# Patient Record
Sex: MALE | Race: WHITE | NOT HISPANIC OR LATINO | Employment: OTHER | URBAN - METROPOLITAN AREA
[De-identification: names, ages, dates, MRNs, and addresses within clinical notes are randomized per-mention and may not be internally consistent; named-entity substitution may affect disease eponyms.]

---

## 2017-04-12 ENCOUNTER — APPOINTMENT (OUTPATIENT)
Dept: PREADMISSION TESTING | Facility: HOSPITAL | Age: 62
End: 2017-04-12

## 2017-04-12 ENCOUNTER — HOSPITAL ENCOUNTER (OUTPATIENT)
Dept: GENERAL RADIOLOGY | Facility: HOSPITAL | Age: 62
Discharge: HOME | End: 2017-04-12
Admitting: ORTHOPAEDIC SURGERY

## 2017-04-12 ENCOUNTER — HOSPITAL ENCOUNTER (OUTPATIENT)
Dept: GENERAL RADIOLOGY | Facility: HOSPITAL | Age: 62
Discharge: HOME | End: 2017-04-12

## 2017-04-12 VITALS
HEART RATE: 70 BPM | RESPIRATION RATE: 18 BRPM | SYSTOLIC BLOOD PRESSURE: 116 MMHG | TEMPERATURE: 97.4 F | HEIGHT: 70 IN | DIASTOLIC BLOOD PRESSURE: 76 MMHG | OXYGEN SATURATION: 97 %

## 2017-04-12 LAB
ABO GROUP BLD: NORMAL
ALBUMIN SERPL-MCNC: 4.4 G/DL (ref 3.5–5.2)
ALBUMIN/GLOB SERPL: 1.4 G/DL
ALP SERPL-CCNC: 85 U/L (ref 39–117)
ALT SERPL W P-5'-P-CCNC: 52 U/L (ref 1–41)
ANION GAP SERPL CALCULATED.3IONS-SCNC: 14.6 MMOL/L
AST SERPL-CCNC: 34 U/L (ref 1–40)
BILIRUB SERPL-MCNC: 0.5 MG/DL (ref 0.1–1.2)
BILIRUB UR QL STRIP: NEGATIVE
BLD GP AB SCN SERPL QL: NEGATIVE
BUN BLD-MCNC: 11 MG/DL (ref 8–23)
BUN/CREAT SERPL: 12.9 (ref 7–25)
CALCIUM SPEC-SCNC: 9.5 MG/DL (ref 8.6–10.5)
CHLORIDE SERPL-SCNC: 101 MMOL/L (ref 98–107)
CLARITY UR: CLEAR
CO2 SERPL-SCNC: 24.4 MMOL/L (ref 22–29)
COLOR UR: YELLOW
CREAT BLD-MCNC: 0.85 MG/DL (ref 0.76–1.27)
DEPRECATED RDW RBC AUTO: 48 FL (ref 37–54)
ERYTHROCYTE [DISTWIDTH] IN BLOOD BY AUTOMATED COUNT: 14.8 % (ref 11.5–14.5)
GFR SERPL CREATININE-BSD FRML MDRD: 92 ML/MIN/1.73
GLOBULIN UR ELPH-MCNC: 3.1 GM/DL
GLUCOSE BLD-MCNC: 103 MG/DL (ref 65–99)
GLUCOSE UR STRIP-MCNC: NEGATIVE MG/DL
HCT VFR BLD AUTO: 45.9 % (ref 40.4–52.2)
HGB BLD-MCNC: 15 G/DL (ref 13.7–17.6)
HGB UR QL STRIP.AUTO: NEGATIVE
INR PPP: 1.04 (ref 0.9–1.1)
KETONES UR QL STRIP: NEGATIVE
LEUKOCYTE ESTERASE UR QL STRIP.AUTO: NEGATIVE
MCH RBC QN AUTO: 29.2 PG (ref 27–32.7)
MCHC RBC AUTO-ENTMCNC: 32.7 G/DL (ref 32.6–36.4)
MCV RBC AUTO: 89.3 FL (ref 79.8–96.2)
NITRITE UR QL STRIP: NEGATIVE
PH UR STRIP.AUTO: 6.5 [PH] (ref 5–8)
PLATELET # BLD AUTO: 155 10*3/MM3 (ref 140–500)
PMV BLD AUTO: 10.1 FL (ref 6–12)
POTASSIUM BLD-SCNC: 4.7 MMOL/L (ref 3.5–5.2)
PROT SERPL-MCNC: 7.5 G/DL (ref 6–8.5)
PROT UR QL STRIP: NEGATIVE
PROTHROMBIN TIME: 13.2 SECONDS (ref 11.7–14.2)
RBC # BLD AUTO: 5.14 10*6/MM3 (ref 4.6–6)
RH BLD: POSITIVE
SODIUM BLD-SCNC: 140 MMOL/L (ref 136–145)
SP GR UR STRIP: 1.01 (ref 1–1.03)
UROBILINOGEN UR QL STRIP: NORMAL
WBC NRBC COR # BLD: 8.68 10*3/MM3 (ref 4.5–10.7)

## 2017-04-12 PROCEDURE — 71020 HC CHEST PA AND LATERAL: CPT

## 2017-04-12 PROCEDURE — 86850 RBC ANTIBODY SCREEN: CPT

## 2017-04-12 PROCEDURE — 93005 ELECTROCARDIOGRAM TRACING: CPT

## 2017-04-12 PROCEDURE — 36415 COLL VENOUS BLD VENIPUNCTURE: CPT

## 2017-04-12 PROCEDURE — 93010 ELECTROCARDIOGRAM REPORT: CPT | Performed by: INTERNAL MEDICINE

## 2017-04-12 PROCEDURE — 86901 BLOOD TYPING SEROLOGIC RH(D): CPT

## 2017-04-12 PROCEDURE — 85610 PROTHROMBIN TIME: CPT

## 2017-04-12 PROCEDURE — 73560 X-RAY EXAM OF KNEE 1 OR 2: CPT

## 2017-04-12 PROCEDURE — 85027 COMPLETE CBC AUTOMATED: CPT

## 2017-04-12 PROCEDURE — 81003 URINALYSIS AUTO W/O SCOPE: CPT

## 2017-04-12 PROCEDURE — 86900 BLOOD TYPING SEROLOGIC ABO: CPT

## 2017-04-12 PROCEDURE — 80053 COMPREHEN METABOLIC PANEL: CPT

## 2017-04-12 RX ORDER — GABAPENTIN 600 MG/1
1200 TABLET ORAL EVERY EVENING
COMMUNITY

## 2017-04-12 RX ORDER — AMLODIPINE BESYLATE 10 MG/1
10 TABLET ORAL DAILY
COMMUNITY
End: 2019-08-07 | Stop reason: ALTCHOICE

## 2017-04-12 RX ORDER — OMEPRAZOLE 20 MG/1
20 CAPSULE, DELAYED RELEASE ORAL DAILY
COMMUNITY
End: 2019-12-09

## 2017-04-12 RX ORDER — OXYCODONE HYDROCHLORIDE 10 MG/1
10 TABLET ORAL EVERY 4 HOURS PRN
COMMUNITY
End: 2019-12-09

## 2017-04-12 RX ORDER — METOPROLOL SUCCINATE 50 MG/1
75 TABLET, EXTENDED RELEASE ORAL DAILY
COMMUNITY
End: 2019-08-14 | Stop reason: SDUPTHER

## 2017-04-12 RX ORDER — ASPIRIN 81 MG/1
81 TABLET ORAL DAILY
COMMUNITY
End: 2017-04-27 | Stop reason: HOSPADM

## 2017-04-12 RX ORDER — BACLOFEN 10 MG/1
5 TABLET ORAL EVERY 8 HOURS PRN
COMMUNITY
End: 2019-12-09

## 2017-04-25 ENCOUNTER — ANESTHESIA EVENT (OUTPATIENT)
Dept: PERIOP | Facility: HOSPITAL | Age: 62
End: 2017-04-25

## 2017-04-25 ENCOUNTER — APPOINTMENT (OUTPATIENT)
Dept: GENERAL RADIOLOGY | Facility: HOSPITAL | Age: 62
End: 2017-04-25

## 2017-04-25 ENCOUNTER — ANESTHESIA (OUTPATIENT)
Dept: PERIOP | Facility: HOSPITAL | Age: 62
End: 2017-04-25

## 2017-04-25 ENCOUNTER — HOSPITAL ENCOUNTER (INPATIENT)
Facility: HOSPITAL | Age: 62
LOS: 2 days | Discharge: HOME HEALTH | End: 2017-04-27
Attending: ORTHOPAEDIC SURGERY | Admitting: ORTHOPAEDIC SURGERY

## 2017-04-25 DIAGNOSIS — R26.89 IMPAIRED GAIT AND MOBILITY: Primary | ICD-10-CM

## 2017-04-25 PROBLEM — M17.10 PRIMARY OSTEOARTHRITIS OF KNEE: Status: ACTIVE | Noted: 2017-04-25

## 2017-04-25 PROCEDURE — 25010000002 HYDROMORPHONE PER 4 MG: Performed by: NURSE ANESTHETIST, CERTIFIED REGISTERED

## 2017-04-25 PROCEDURE — C1713 ANCHOR/SCREW BN/BN,TIS/BN: HCPCS | Performed by: ORTHOPAEDIC SURGERY

## 2017-04-25 PROCEDURE — 25010000002 SUCCINYLCHOLINE PER 20 MG: Performed by: NURSE ANESTHETIST, CERTIFIED REGISTERED

## 2017-04-25 PROCEDURE — 25010000002 MORPHINE PER 10 MG: Performed by: ORTHOPAEDIC SURGERY

## 2017-04-25 PROCEDURE — 25010000002 KETOROLAC TROMETHAMINE PER 15 MG: Performed by: ORTHOPAEDIC SURGERY

## 2017-04-25 PROCEDURE — C1776 JOINT DEVICE (IMPLANTABLE): HCPCS | Performed by: ORTHOPAEDIC SURGERY

## 2017-04-25 PROCEDURE — 25010000003 CEFAZOLIN IN DEXTROSE 2-4 GM/100ML-% SOLUTION: Performed by: ORTHOPAEDIC SURGERY

## 2017-04-25 PROCEDURE — 0SRC0J9 REPLACEMENT OF RIGHT KNEE JOINT WITH SYNTHETIC SUBSTITUTE, CEMENTED, OPEN APPROACH: ICD-10-PCS | Performed by: ORTHOPAEDIC SURGERY

## 2017-04-25 PROCEDURE — 25010000002 FENTANYL CITRATE (PF) 100 MCG/2ML SOLUTION: Performed by: NURSE ANESTHETIST, CERTIFIED REGISTERED

## 2017-04-25 PROCEDURE — 25010000002 PROPOFOL 10 MG/ML EMULSION: Performed by: NURSE ANESTHETIST, CERTIFIED REGISTERED

## 2017-04-25 PROCEDURE — 73560 X-RAY EXAM OF KNEE 1 OR 2: CPT

## 2017-04-25 PROCEDURE — 25010000002 MIDAZOLAM PER 1 MG: Performed by: ANESTHESIOLOGY

## 2017-04-25 PROCEDURE — 25010000002 MORPHINE (PF) 10 MG/ML SOLUTION 1 ML VIAL: Performed by: ORTHOPAEDIC SURGERY

## 2017-04-25 PROCEDURE — 25010000002 FENTANYL CITRATE (PF) 100 MCG/2ML SOLUTION: Performed by: ANESTHESIOLOGY

## 2017-04-25 PROCEDURE — 25010000002 KETOROLAC TROMETHAMINE PER 15 MG

## 2017-04-25 PROCEDURE — 25010000002 DEXAMETHASONE PER 1 MG: Performed by: NURSE ANESTHETIST, CERTIFIED REGISTERED

## 2017-04-25 PROCEDURE — 25010000002 EPINEPHRINE PER 0.1 MG: Performed by: ORTHOPAEDIC SURGERY

## 2017-04-25 PROCEDURE — 94799 UNLISTED PULMONARY SVC/PX: CPT

## 2017-04-25 PROCEDURE — 25010000002 ROPIVACAINE PER 1 MG: Performed by: ORTHOPAEDIC SURGERY

## 2017-04-25 PROCEDURE — 97161 PT EVAL LOW COMPLEX 20 MIN: CPT

## 2017-04-25 PROCEDURE — 25010000002 ONDANSETRON PER 1 MG: Performed by: NURSE ANESTHETIST, CERTIFIED REGISTERED

## 2017-04-25 DEVICE — CAP TOTL KN CMT PREMIUM: Type: IMPLANTABLE DEVICE | Site: KNEE | Status: FUNCTIONAL

## 2017-04-25 DEVICE — BEAR TIB/KN VANGUARD CR DCM 14X79/83MM NS: Type: IMPLANTABLE DEVICE | Site: KNEE | Status: FUNCTIONAL

## 2017-04-25 DEVICE — STEM TIB PRI FINN 46X40MM: Type: IMPLANTABLE DEVICE | Site: KNEE | Status: FUNCTIONAL

## 2017-04-25 DEVICE — CMT BONE PALACOS 120001: Type: IMPLANTABLE DEVICE | Site: KNEE | Status: FUNCTIONAL

## 2017-04-25 DEVICE — COMP FEM/KN VANGUARD INTLK CR 70MM NS RT: Type: IMPLANTABLE DEVICE | Site: KNEE | Status: FUNCTIONAL

## 2017-04-25 DEVICE — PAT 3PEG THN 37X9.6 37MM: Type: IMPLANTABLE DEVICE | Site: KNEE | Status: FUNCTIONAL

## 2017-04-25 DEVICE — TRY TIB INTERLOK PRI 79MM: Type: IMPLANTABLE DEVICE | Site: KNEE | Status: FUNCTIONAL

## 2017-04-25 RX ORDER — SODIUM CHLORIDE 0.9 % (FLUSH) 0.9 %
1-10 SYRINGE (ML) INJECTION AS NEEDED
Status: DISCONTINUED | OUTPATIENT
Start: 2017-04-25 | End: 2017-04-25 | Stop reason: HOSPADM

## 2017-04-25 RX ORDER — ONDANSETRON 2 MG/ML
4 INJECTION INTRAMUSCULAR; INTRAVENOUS ONCE AS NEEDED
Status: DISCONTINUED | OUTPATIENT
Start: 2017-04-25 | End: 2017-04-25 | Stop reason: HOSPADM

## 2017-04-25 RX ORDER — NALOXONE HCL 0.4 MG/ML
0.4 VIAL (ML) INJECTION
Status: DISCONTINUED | OUTPATIENT
Start: 2017-04-25 | End: 2017-04-27 | Stop reason: HOSPADM

## 2017-04-25 RX ORDER — PROMETHAZINE HYDROCHLORIDE 25 MG/ML
12.5 INJECTION, SOLUTION INTRAMUSCULAR; INTRAVENOUS EVERY 6 HOURS PRN
Status: DISCONTINUED | OUTPATIENT
Start: 2017-04-25 | End: 2017-04-27 | Stop reason: HOSPADM

## 2017-04-25 RX ORDER — SODIUM CHLORIDE, SODIUM LACTATE, POTASSIUM CHLORIDE, CALCIUM CHLORIDE 600; 310; 30; 20 MG/100ML; MG/100ML; MG/100ML; MG/100ML
100 INJECTION, SOLUTION INTRAVENOUS CONTINUOUS
Status: DISCONTINUED | OUTPATIENT
Start: 2017-04-25 | End: 2017-04-27 | Stop reason: HOSPADM

## 2017-04-25 RX ORDER — FENTANYL CITRATE 50 UG/ML
50 INJECTION, SOLUTION INTRAMUSCULAR; INTRAVENOUS
Status: COMPLETED | OUTPATIENT
Start: 2017-04-25 | End: 2017-04-25

## 2017-04-25 RX ORDER — PROMETHAZINE HYDROCHLORIDE 25 MG/ML
5 INJECTION, SOLUTION INTRAMUSCULAR; INTRAVENOUS
Status: DISCONTINUED | OUTPATIENT
Start: 2017-04-25 | End: 2017-04-25 | Stop reason: HOSPADM

## 2017-04-25 RX ORDER — KETOROLAC TROMETHAMINE 15 MG/ML
INJECTION, SOLUTION INTRAMUSCULAR; INTRAVENOUS
Status: COMPLETED
Start: 2017-04-25 | End: 2017-04-25

## 2017-04-25 RX ORDER — ASPIRIN 325 MG
325 TABLET, DELAYED RELEASE (ENTERIC COATED) ORAL DAILY
Status: DISCONTINUED | OUTPATIENT
Start: 2017-04-26 | End: 2017-04-27 | Stop reason: HOSPADM

## 2017-04-25 RX ORDER — BACLOFEN 10 MG/1
5 TABLET ORAL EVERY 8 HOURS PRN
Status: DISCONTINUED | OUTPATIENT
Start: 2017-04-25 | End: 2017-04-27 | Stop reason: HOSPADM

## 2017-04-25 RX ORDER — DIAZEPAM 5 MG/1
5 TABLET ORAL EVERY 6 HOURS PRN
Status: DISCONTINUED | OUTPATIENT
Start: 2017-04-25 | End: 2017-04-27 | Stop reason: HOSPADM

## 2017-04-25 RX ORDER — FENTANYL CITRATE 50 UG/ML
INJECTION, SOLUTION INTRAMUSCULAR; INTRAVENOUS AS NEEDED
Status: DISCONTINUED | OUTPATIENT
Start: 2017-04-25 | End: 2017-04-25 | Stop reason: SURG

## 2017-04-25 RX ORDER — HYDRALAZINE HYDROCHLORIDE 20 MG/ML
5 INJECTION INTRAMUSCULAR; INTRAVENOUS
Status: DISCONTINUED | OUTPATIENT
Start: 2017-04-25 | End: 2017-04-25 | Stop reason: HOSPADM

## 2017-04-25 RX ORDER — PROPOFOL 10 MG/ML
VIAL (ML) INTRAVENOUS AS NEEDED
Status: DISCONTINUED | OUTPATIENT
Start: 2017-04-25 | End: 2017-04-25 | Stop reason: SURG

## 2017-04-25 RX ORDER — SUCCINYLCHOLINE CHLORIDE 20 MG/ML
INJECTION INTRAMUSCULAR; INTRAVENOUS AS NEEDED
Status: DISCONTINUED | OUTPATIENT
Start: 2017-04-25 | End: 2017-04-25 | Stop reason: SURG

## 2017-04-25 RX ORDER — DIPHENHYDRAMINE HCL 50 MG
50 CAPSULE ORAL EVERY 6 HOURS PRN
Status: DISCONTINUED | OUTPATIENT
Start: 2017-04-25 | End: 2017-04-27 | Stop reason: HOSPADM

## 2017-04-25 RX ORDER — TRANEXAMIC ACID 100 MG/ML
INJECTION, SOLUTION INTRAVENOUS AS NEEDED
Status: DISCONTINUED | OUTPATIENT
Start: 2017-04-25 | End: 2017-04-25 | Stop reason: SURG

## 2017-04-25 RX ORDER — ACETAMINOPHEN 500 MG
1000 TABLET ORAL ONCE
Status: DISCONTINUED | OUTPATIENT
Start: 2017-04-25 | End: 2017-04-27 | Stop reason: HOSPADM

## 2017-04-25 RX ORDER — METOPROLOL SUCCINATE 50 MG/1
50 TABLET, EXTENDED RELEASE ORAL DAILY
Status: DISCONTINUED | OUTPATIENT
Start: 2017-04-25 | End: 2017-04-27 | Stop reason: HOSPADM

## 2017-04-25 RX ORDER — MIDAZOLAM HYDROCHLORIDE 1 MG/ML
1 INJECTION INTRAMUSCULAR; INTRAVENOUS
Status: DISCONTINUED | OUTPATIENT
Start: 2017-04-25 | End: 2017-04-25 | Stop reason: HOSPADM

## 2017-04-25 RX ORDER — ONDANSETRON 4 MG/1
4 TABLET, FILM COATED ORAL EVERY 6 HOURS PRN
Status: DISCONTINUED | OUTPATIENT
Start: 2017-04-25 | End: 2017-04-27 | Stop reason: HOSPADM

## 2017-04-25 RX ORDER — BISACODYL 10 MG
10 SUPPOSITORY, RECTAL RECTAL DAILY PRN
Status: DISCONTINUED | OUTPATIENT
Start: 2017-04-25 | End: 2017-04-27 | Stop reason: HOSPADM

## 2017-04-25 RX ORDER — HYDROMORPHONE HYDROCHLORIDE 1 MG/ML
0.5 INJECTION, SOLUTION INTRAMUSCULAR; INTRAVENOUS; SUBCUTANEOUS
Status: DISCONTINUED | OUTPATIENT
Start: 2017-04-25 | End: 2017-04-25 | Stop reason: HOSPADM

## 2017-04-25 RX ORDER — DIPHENHYDRAMINE HYDROCHLORIDE 50 MG/ML
12.5 INJECTION INTRAMUSCULAR; INTRAVENOUS
Status: DISCONTINUED | OUTPATIENT
Start: 2017-04-25 | End: 2017-04-25 | Stop reason: HOSPADM

## 2017-04-25 RX ORDER — LIDOCAINE HYDROCHLORIDE 20 MG/ML
INJECTION, SOLUTION INFILTRATION; PERINEURAL AS NEEDED
Status: DISCONTINUED | OUTPATIENT
Start: 2017-04-25 | End: 2017-04-25 | Stop reason: SURG

## 2017-04-25 RX ORDER — KETOROLAC TROMETHAMINE 30 MG/ML
15 INJECTION, SOLUTION INTRAMUSCULAR; INTRAVENOUS EVERY 8 HOURS PRN
Status: DISCONTINUED | OUTPATIENT
Start: 2017-04-25 | End: 2017-04-27 | Stop reason: HOSPADM

## 2017-04-25 RX ORDER — PROMETHAZINE HYDROCHLORIDE 25 MG/ML
12.5 INJECTION, SOLUTION INTRAMUSCULAR; INTRAVENOUS ONCE AS NEEDED
Status: DISCONTINUED | OUTPATIENT
Start: 2017-04-25 | End: 2017-04-25 | Stop reason: HOSPADM

## 2017-04-25 RX ORDER — UREA 10 %
1 LOTION (ML) TOPICAL NIGHTLY PRN
Status: DISCONTINUED | OUTPATIENT
Start: 2017-04-25 | End: 2017-04-27 | Stop reason: HOSPADM

## 2017-04-25 RX ORDER — LIDOCAINE HYDROCHLORIDE 10 MG/ML
5 INJECTION, SOLUTION EPIDURAL; INFILTRATION; INTRACAUDAL; PERINEURAL ONCE
Status: DISCONTINUED | OUTPATIENT
Start: 2017-04-25 | End: 2017-04-25 | Stop reason: HOSPADM

## 2017-04-25 RX ORDER — ACETAMINOPHEN 500 MG
1000 TABLET ORAL ONCE
Status: COMPLETED | OUTPATIENT
Start: 2017-04-25 | End: 2017-04-25

## 2017-04-25 RX ORDER — FENTANYL CITRATE 50 UG/ML
50 INJECTION, SOLUTION INTRAMUSCULAR; INTRAVENOUS
Status: DISCONTINUED | OUTPATIENT
Start: 2017-04-25 | End: 2017-04-25 | Stop reason: HOSPADM

## 2017-04-25 RX ORDER — CELECOXIB 200 MG/1
200 CAPSULE ORAL ONCE
Status: COMPLETED | OUTPATIENT
Start: 2017-04-25 | End: 2017-04-25

## 2017-04-25 RX ORDER — ROCURONIUM BROMIDE 10 MG/ML
INJECTION, SOLUTION INTRAVENOUS AS NEEDED
Status: DISCONTINUED | OUTPATIENT
Start: 2017-04-25 | End: 2017-04-25 | Stop reason: SURG

## 2017-04-25 RX ORDER — PANTOPRAZOLE SODIUM 40 MG/1
40 TABLET, DELAYED RELEASE ORAL
Status: DISCONTINUED | OUTPATIENT
Start: 2017-04-26 | End: 2017-04-27 | Stop reason: HOSPADM

## 2017-04-25 RX ORDER — CLINDAMYCIN PHOSPHATE 900 MG/50ML
900 INJECTION INTRAVENOUS EVERY 8 HOURS
Status: COMPLETED | OUTPATIENT
Start: 2017-04-25 | End: 2017-04-26

## 2017-04-25 RX ORDER — SODIUM CHLORIDE 450 MG/100ML
100 INJECTION, SOLUTION INTRAVENOUS CONTINUOUS
Status: DISCONTINUED | OUTPATIENT
Start: 2017-04-25 | End: 2017-04-27 | Stop reason: HOSPADM

## 2017-04-25 RX ORDER — PROMETHAZINE HYDROCHLORIDE 25 MG/1
25 SUPPOSITORY RECTAL ONCE AS NEEDED
Status: DISCONTINUED | OUTPATIENT
Start: 2017-04-25 | End: 2017-04-25 | Stop reason: HOSPADM

## 2017-04-25 RX ORDER — LABETALOL HYDROCHLORIDE 5 MG/ML
5 INJECTION, SOLUTION INTRAVENOUS
Status: DISCONTINUED | OUTPATIENT
Start: 2017-04-25 | End: 2017-04-25 | Stop reason: HOSPADM

## 2017-04-25 RX ORDER — FERROUS SULFATE 325(65) MG
325 TABLET ORAL
Status: DISCONTINUED | OUTPATIENT
Start: 2017-04-26 | End: 2017-04-27 | Stop reason: HOSPADM

## 2017-04-25 RX ORDER — CELECOXIB 200 MG/1
200 CAPSULE ORAL DAILY
Status: DISCONTINUED | OUTPATIENT
Start: 2017-04-25 | End: 2017-04-27 | Stop reason: HOSPADM

## 2017-04-25 RX ORDER — GABAPENTIN 100 MG/1
100 CAPSULE ORAL EVERY 8 HOURS PRN
Status: DISCONTINUED | OUTPATIENT
Start: 2017-04-25 | End: 2017-04-27 | Stop reason: HOSPADM

## 2017-04-25 RX ORDER — AMLODIPINE BESYLATE 10 MG/1
10 TABLET ORAL DAILY
Status: DISCONTINUED | OUTPATIENT
Start: 2017-04-25 | End: 2017-04-27 | Stop reason: HOSPADM

## 2017-04-25 RX ORDER — CEFAZOLIN SODIUM 2 G/100ML
2 INJECTION, SOLUTION INTRAVENOUS ONCE
Status: COMPLETED | OUTPATIENT
Start: 2017-04-25 | End: 2017-04-25

## 2017-04-25 RX ORDER — ONDANSETRON 2 MG/ML
INJECTION INTRAMUSCULAR; INTRAVENOUS AS NEEDED
Status: DISCONTINUED | OUTPATIENT
Start: 2017-04-25 | End: 2017-04-25 | Stop reason: SURG

## 2017-04-25 RX ORDER — ALBUTEROL SULFATE 2.5 MG/3ML
2.5 SOLUTION RESPIRATORY (INHALATION) ONCE AS NEEDED
Status: DISCONTINUED | OUTPATIENT
Start: 2017-04-25 | End: 2017-04-25 | Stop reason: HOSPADM

## 2017-04-25 RX ORDER — SENNA AND DOCUSATE SODIUM 50; 8.6 MG/1; MG/1
2 TABLET, FILM COATED ORAL 2 TIMES DAILY
Status: DISCONTINUED | OUTPATIENT
Start: 2017-04-25 | End: 2017-04-27 | Stop reason: HOSPADM

## 2017-04-25 RX ORDER — PROMETHAZINE HYDROCHLORIDE 25 MG/1
25 TABLET ORAL ONCE AS NEEDED
Status: DISCONTINUED | OUTPATIENT
Start: 2017-04-25 | End: 2017-04-25 | Stop reason: HOSPADM

## 2017-04-25 RX ORDER — OXYCODONE HYDROCHLORIDE AND ACETAMINOPHEN 5; 325 MG/1; MG/1
2 TABLET ORAL EVERY 4 HOURS PRN
Status: DISCONTINUED | OUTPATIENT
Start: 2017-04-25 | End: 2017-04-26

## 2017-04-25 RX ORDER — FAMOTIDINE 10 MG/ML
20 INJECTION, SOLUTION INTRAVENOUS ONCE
Status: COMPLETED | OUTPATIENT
Start: 2017-04-25 | End: 2017-04-25

## 2017-04-25 RX ORDER — CLINDAMYCIN PHOSPHATE 900 MG/50ML
900 INJECTION INTRAVENOUS ONCE
Status: DISCONTINUED | OUTPATIENT
Start: 2017-04-25 | End: 2017-04-25

## 2017-04-25 RX ORDER — ACETAMINOPHEN 325 MG/1
650 TABLET ORAL EVERY 4 HOURS PRN
Status: DISCONTINUED | OUTPATIENT
Start: 2017-04-25 | End: 2017-04-27 | Stop reason: HOSPADM

## 2017-04-25 RX ORDER — ONDANSETRON 4 MG/1
4 TABLET, ORALLY DISINTEGRATING ORAL EVERY 6 HOURS PRN
Status: DISCONTINUED | OUTPATIENT
Start: 2017-04-25 | End: 2017-04-27 | Stop reason: HOSPADM

## 2017-04-25 RX ORDER — SODIUM CHLORIDE 0.9 % (FLUSH) 0.9 %
1-10 SYRINGE (ML) INJECTION AS NEEDED
Status: DISCONTINUED | OUTPATIENT
Start: 2017-04-25 | End: 2017-04-27 | Stop reason: HOSPADM

## 2017-04-25 RX ORDER — DIAZEPAM 5 MG/ML
5 INJECTION, SOLUTION INTRAMUSCULAR; INTRAVENOUS 2 TIMES DAILY PRN
Status: ACTIVE | OUTPATIENT
Start: 2017-04-25 | End: 2017-04-26

## 2017-04-25 RX ORDER — NALOXONE HCL 0.4 MG/ML
0.2 VIAL (ML) INJECTION AS NEEDED
Status: DISCONTINUED | OUTPATIENT
Start: 2017-04-25 | End: 2017-04-25 | Stop reason: HOSPADM

## 2017-04-25 RX ORDER — DEXAMETHASONE SODIUM PHOSPHATE 10 MG/ML
INJECTION INTRAMUSCULAR; INTRAVENOUS AS NEEDED
Status: DISCONTINUED | OUTPATIENT
Start: 2017-04-25 | End: 2017-04-25 | Stop reason: SURG

## 2017-04-25 RX ORDER — FLUMAZENIL 0.1 MG/ML
0.2 INJECTION INTRAVENOUS AS NEEDED
Status: DISCONTINUED | OUTPATIENT
Start: 2017-04-25 | End: 2017-04-25 | Stop reason: HOSPADM

## 2017-04-25 RX ORDER — MIDAZOLAM HYDROCHLORIDE 1 MG/ML
2 INJECTION INTRAMUSCULAR; INTRAVENOUS
Status: DISCONTINUED | OUTPATIENT
Start: 2017-04-25 | End: 2017-04-25 | Stop reason: HOSPADM

## 2017-04-25 RX ORDER — DOCUSATE SODIUM 100 MG/1
100 CAPSULE, LIQUID FILLED ORAL 2 TIMES DAILY PRN
Status: DISCONTINUED | OUTPATIENT
Start: 2017-04-25 | End: 2017-04-27 | Stop reason: HOSPADM

## 2017-04-25 RX ORDER — ONDANSETRON 2 MG/ML
4 INJECTION INTRAMUSCULAR; INTRAVENOUS EVERY 6 HOURS PRN
Status: DISCONTINUED | OUTPATIENT
Start: 2017-04-25 | End: 2017-04-27 | Stop reason: HOSPADM

## 2017-04-25 RX ORDER — ACETAMINOPHEN 325 MG/1
325 TABLET ORAL EVERY 4 HOURS PRN
Status: DISCONTINUED | OUTPATIENT
Start: 2017-04-25 | End: 2017-04-27 | Stop reason: HOSPADM

## 2017-04-25 RX ORDER — OXYCODONE HYDROCHLORIDE 5 MG/1
10 TABLET ORAL EVERY 4 HOURS PRN
Status: DISCONTINUED | OUTPATIENT
Start: 2017-04-25 | End: 2017-04-27 | Stop reason: HOSPADM

## 2017-04-25 RX ORDER — SODIUM CHLORIDE, SODIUM LACTATE, POTASSIUM CHLORIDE, CALCIUM CHLORIDE 600; 310; 30; 20 MG/100ML; MG/100ML; MG/100ML; MG/100ML
9 INJECTION, SOLUTION INTRAVENOUS CONTINUOUS
Status: DISCONTINUED | OUTPATIENT
Start: 2017-04-25 | End: 2017-04-27 | Stop reason: HOSPADM

## 2017-04-25 RX ORDER — OXYCODONE HYDROCHLORIDE AND ACETAMINOPHEN 5; 325 MG/1; MG/1
1 TABLET ORAL EVERY 4 HOURS PRN
Status: DISCONTINUED | OUTPATIENT
Start: 2017-04-25 | End: 2017-04-26

## 2017-04-25 RX ADMIN — FENTANYL CITRATE 50 MCG: 50 INJECTION INTRAMUSCULAR; INTRAVENOUS at 13:17

## 2017-04-25 RX ADMIN — KETOROLAC TROMETHAMINE 15 MG: 15 INJECTION, SOLUTION INTRAMUSCULAR; INTRAVENOUS at 13:42

## 2017-04-25 RX ADMIN — FENTANYL CITRATE 50 MCG: 50 INJECTION, SOLUTION INTRAMUSCULAR; INTRAVENOUS at 12:25

## 2017-04-25 RX ADMIN — DEXAMETHASONE SODIUM PHOSPHATE 10 MG: 10 INJECTION INTRAMUSCULAR; INTRAVENOUS at 10:55

## 2017-04-25 RX ADMIN — FENTANYL CITRATE 50 MCG: 50 INJECTION INTRAMUSCULAR; INTRAVENOUS at 14:05

## 2017-04-25 RX ADMIN — FENTANYL CITRATE 25 MCG: 50 INJECTION, SOLUTION INTRAMUSCULAR; INTRAVENOUS at 12:15

## 2017-04-25 RX ADMIN — FENTANYL CITRATE 50 MCG: 50 INJECTION INTRAMUSCULAR; INTRAVENOUS at 13:02

## 2017-04-25 RX ADMIN — CELECOXIB 200 MG: 200 CAPSULE ORAL at 08:58

## 2017-04-25 RX ADMIN — TRANEXAMIC ACID 1000 MG: 100 INJECTION, SOLUTION INTRAVENOUS at 11:47

## 2017-04-25 RX ADMIN — FENTANYL CITRATE 25 MCG: 50 INJECTION, SOLUTION INTRAMUSCULAR; INTRAVENOUS at 11:50

## 2017-04-25 RX ADMIN — CEFAZOLIN SODIUM 2 G: 2 INJECTION, SOLUTION INTRAVENOUS at 12:10

## 2017-04-25 RX ADMIN — ONDANSETRON 4 MG: 2 INJECTION INTRAMUSCULAR; INTRAVENOUS at 12:15

## 2017-04-25 RX ADMIN — FENTANYL CITRATE 25 MCG: 50 INJECTION, SOLUTION INTRAMUSCULAR; INTRAVENOUS at 12:20

## 2017-04-25 RX ADMIN — HYDROMORPHONE HYDROCHLORIDE 0.5 MG: 1 INJECTION, SOLUTION INTRAMUSCULAR; INTRAVENOUS; SUBCUTANEOUS at 14:22

## 2017-04-25 RX ADMIN — FENTANYL CITRATE 25 MCG: 50 INJECTION, SOLUTION INTRAMUSCULAR; INTRAVENOUS at 11:55

## 2017-04-25 RX ADMIN — MIDAZOLAM 1 MG: 1 INJECTION INTRAMUSCULAR; INTRAVENOUS at 09:51

## 2017-04-25 RX ADMIN — DIAZEPAM 5 MG: 5 TABLET ORAL at 22:16

## 2017-04-25 RX ADMIN — CEFAZOLIN SODIUM 2 G: 2 INJECTION, SOLUTION INTRAVENOUS at 10:26

## 2017-04-25 RX ADMIN — EPHEDRINE SULFATE 5 MG: 50 INJECTION INTRAMUSCULAR; INTRAVENOUS; SUBCUTANEOUS at 10:35

## 2017-04-25 RX ADMIN — ROCURONIUM BROMIDE 5 MG: 10 INJECTION INTRAVENOUS at 10:34

## 2017-04-25 RX ADMIN — SUCCINYLCHOLINE CHLORIDE 160 MG: 20 INJECTION, SOLUTION INTRAMUSCULAR; INTRAVENOUS; PARENTERAL at 10:34

## 2017-04-25 RX ADMIN — HYDROMORPHONE HYDROCHLORIDE 0.5 MG: 1 INJECTION, SOLUTION INTRAMUSCULAR; INTRAVENOUS; SUBCUTANEOUS at 13:55

## 2017-04-25 RX ADMIN — OXYCODONE HYDROCHLORIDE AND ACETAMINOPHEN 2 TABLET: 5; 325 TABLET ORAL at 15:52

## 2017-04-25 RX ADMIN — FENTANYL CITRATE 50 MCG: 50 INJECTION INTRAMUSCULAR; INTRAVENOUS at 13:42

## 2017-04-25 RX ADMIN — MIDAZOLAM 2 MG: 1 INJECTION INTRAMUSCULAR; INTRAVENOUS at 09:48

## 2017-04-25 RX ADMIN — SODIUM CHLORIDE 100 ML/HR: 4.5 INJECTION, SOLUTION INTRAVENOUS at 20:00

## 2017-04-25 RX ADMIN — FENTANYL CITRATE 50 MCG: 50 INJECTION, SOLUTION INTRAMUSCULAR; INTRAVENOUS at 10:30

## 2017-04-25 RX ADMIN — MIDAZOLAM 2 MG: 1 INJECTION INTRAMUSCULAR; INTRAVENOUS at 08:57

## 2017-04-25 RX ADMIN — PROPOFOL 150 MG: 10 INJECTION, EMULSION INTRAVENOUS at 10:34

## 2017-04-25 RX ADMIN — FENTANYL CITRATE 25 MCG: 50 INJECTION, SOLUTION INTRAMUSCULAR; INTRAVENOUS at 11:25

## 2017-04-25 RX ADMIN — ACETAMINOPHEN 1000 MG: 500 TABLET ORAL at 08:58

## 2017-04-25 RX ADMIN — CLINDAMYCIN PHOSPHATE 900 MG: 18 INJECTION, SOLUTION INTRAMUSCULAR; INTRAVENOUS at 20:00

## 2017-04-25 RX ADMIN — SODIUM CHLORIDE, POTASSIUM CHLORIDE, SODIUM LACTATE AND CALCIUM CHLORIDE: 600; 310; 30; 20 INJECTION, SOLUTION INTRAVENOUS at 11:25

## 2017-04-25 RX ADMIN — MORPHINE SULFATE 6 MG: 4 INJECTION, SOLUTION INTRAMUSCULAR; INTRAVENOUS at 22:16

## 2017-04-25 RX ADMIN — FAMOTIDINE 20 MG: 10 INJECTION, SOLUTION INTRAVENOUS at 08:57

## 2017-04-25 RX ADMIN — LIDOCAINE HYDROCHLORIDE 40 MG: 20 INJECTION, SOLUTION INFILTRATION; PERINEURAL at 10:34

## 2017-04-25 RX ADMIN — FENTANYL CITRATE 50 MCG: 50 INJECTION INTRAMUSCULAR; INTRAVENOUS at 08:57

## 2017-04-25 RX ADMIN — FENTANYL CITRATE 25 MCG: 50 INJECTION, SOLUTION INTRAMUSCULAR; INTRAVENOUS at 11:20

## 2017-04-25 RX ADMIN — EPHEDRINE SULFATE 5 MG: 50 INJECTION INTRAMUSCULAR; INTRAVENOUS; SUBCUTANEOUS at 10:45

## 2017-04-25 RX ADMIN — HYDROMORPHONE HYDROCHLORIDE 0.5 MG: 1 INJECTION, SOLUTION INTRAMUSCULAR; INTRAVENOUS; SUBCUTANEOUS at 13:09

## 2017-04-25 RX ADMIN — FENTANYL CITRATE 100 MCG: 50 INJECTION INTRAMUSCULAR; INTRAVENOUS at 09:48

## 2017-04-25 RX ADMIN — OXYCODONE HYDROCHLORIDE AND ACETAMINOPHEN 2 TABLET: 5; 325 TABLET ORAL at 19:59

## 2017-04-25 RX ADMIN — SODIUM CHLORIDE, POTASSIUM CHLORIDE, SODIUM LACTATE AND CALCIUM CHLORIDE 9 ML/HR: 600; 310; 30; 20 INJECTION, SOLUTION INTRAVENOUS at 08:58

## 2017-04-25 RX ADMIN — HYDROMORPHONE HYDROCHLORIDE 0.5 MG: 1 INJECTION, SOLUTION INTRAMUSCULAR; INTRAVENOUS; SUBCUTANEOUS at 13:22

## 2017-04-25 NOTE — ANESTHESIA POSTPROCEDURE EVALUATION
Patient: Chris Brown Jr.    Procedure Summary     Date Anesthesia Start Anesthesia Stop Room / Location    04/25/17 1026 1245 [facility] / [facility]       Procedure Diagnosis Surgeon Provider    RT TOTAL KNEE ARTHROPLASTY (Right Knee) No diagnosis on file. MD Luis Fernando Mckenna MD          Anesthesia Type: general  Last vitals  /96 (04/25/17 1350)    Temp      Pulse 72 (04/25/17 1350)   Resp 18 (04/25/17 1350)    SpO2 97 % (04/25/17 1350)      Post Anesthesia Care and Evaluation    Patient location during evaluation: PACU  Patient participation: complete - patient participated  Level of consciousness: sleepy but conscious  Pain score: 0  Pain management: adequate  Airway patency: patent  Anesthetic complications: No anesthetic complications    Cardiovascular status: acceptable  Respiratory status: acceptable  Hydration status: acceptable

## 2017-04-25 NOTE — ANESTHESIA PROCEDURE NOTES
Peripheral Block    Patient location during procedure: pre-op  Start time: 4/25/2017 9:45 AM  Stop time: 4/25/2017 9:51 AM  Reason for block: at surgeon's request and post-op pain management  Performed by  Anesthesiologist: SUMIT ASCENCIO  Preanesthetic Checklist  Completed: patient identified, site marked, surgical consent, pre-op evaluation, timeout performed, IV checked, risks and benefits discussed and monitors and equipment checked  Peripheral Block Prep:  Sterile barriers:cap, gloves and mask  Prep: ChloraPrep  Patient monitoring: blood pressure monitoring, continuous pulse oximetry and EKG  Peripheral Procedure  Sedation:yes  Guidance:ultrasound guided  Images:still images obtained    Laterality:right  Block Type:adductor canal block and femoral  Injection Technique:single-shot  Needle Type:echogenic  Needle Gauge:22 G  ULTRASOUND INTERPRETATION. Using ultrasound guidance a 22 G gauge needle was placed in close proximity to the nerve, at which point, under ultrasound guidance anesthetic was injected in the area of the nerve and spread of the anesthesia was seen on ultrasound in close proximity thereto.  There were no abnormalities seen on ultrasound; a digital image was taken; and the patient tolerated the procedure with no complications.   Post Assessment  Injection Assessment: negative aspiration for heme, no paresthesia on injection and incremental injection  Patient Tolerance:comfortable throughout block  Complications:no

## 2017-04-25 NOTE — ANESTHESIA PROCEDURE NOTES
Airway  Airway not difficult    General Information and Staff    Patient location during procedure: OR  Anesthesiologist: CHADWICK COHEN  CRNA: JANIS GÓMEZ    Indications and Patient Condition  Indications for airway management: airway protection    Preoxygenated: yes  MILS maintained throughout  Mask difficulty assessment: 2 - vent by mask + OA or adjuvant +/- NMBA    Final Airway Details  Final airway type: endotracheal airway      Successful airway: ETT  Cuffed: yes   Successful intubation technique: direct laryngoscopy and video laryngoscopy  Facilitating devices/methods: intubating stylet  Endotracheal tube insertion site: oral  Blade: Pascual  Blade size: #4  ETT size: 7.5 mm  Placement verified by: chest auscultation and capnometry   Cuff volume (mL): 8  Measured from: lips  ETT to lips (cm): 22  Number of attempts at approach: 1    Additional Comments  Atraumatic ET Tube placement under DVOC with GS 4 MAC. Neck in neutral position thru out mask ventilation and DL.  Teeth as pre-op. BLEBS.  -ABD sounds.  +ET CO2.  Secured to face

## 2017-04-25 NOTE — ANESTHESIA PREPROCEDURE EVALUATION
Anesthesia Evaluation     Patient summary reviewed and Nursing notes reviewed   no history of anesthetic complications:  NPO Status: > 8 hours   Airway   Mallampati: III  TM distance: <3 FB  Neck ROM: limited  possible difficult intubation  Dental - normal exam     Pulmonary - negative pulmonary ROS and normal exam    breath sounds clear to auscultation  (-) rhonchi, decreased breath sounds, wheezes, rales, stridor  Cardiovascular - normal exam    ECG reviewed  Patient on routine beta blocker and Beta blocker given within 24 hours of surgery  Rhythm: regular  Rate: normal    (+) hypertension well controlled,   (-) murmur, weak pulses, friction rub, systolic click, carotid bruits, JVD, peripheral edema      Neuro/Psych- negative ROS  GI/Hepatic/Renal/Endo    (+)  GERD,     Musculoskeletal (-) negative ROS    Abdominal  - normal exam    Abdomen: soft.   Substance History - negative use     OB/GYN negative ob/gyn ROS         Other - negative ROS                                   Anesthesia Plan    ASA 3     general     intravenous induction   Anesthetic plan and risks discussed with patient and spouse/significant other.

## 2017-04-26 LAB
ANION GAP SERPL CALCULATED.3IONS-SCNC: 12.3 MMOL/L
BUN BLD-MCNC: 14 MG/DL (ref 8–23)
BUN/CREAT SERPL: 16.9 (ref 7–25)
CALCIUM SPEC-SCNC: 8.1 MG/DL (ref 8.6–10.5)
CHLORIDE SERPL-SCNC: 100 MMOL/L (ref 98–107)
CO2 SERPL-SCNC: 24.7 MMOL/L (ref 22–29)
CREAT BLD-MCNC: 0.83 MG/DL (ref 0.76–1.27)
GFR SERPL CREATININE-BSD FRML MDRD: 95 ML/MIN/1.73
GLUCOSE BLD-MCNC: 158 MG/DL (ref 65–99)
HCT VFR BLD AUTO: 35.6 % (ref 40.4–52.2)
HGB BLD-MCNC: 11.6 G/DL (ref 13.7–17.6)
POTASSIUM BLD-SCNC: 4.3 MMOL/L (ref 3.5–5.2)
SODIUM BLD-SCNC: 137 MMOL/L (ref 136–145)

## 2017-04-26 PROCEDURE — 97110 THERAPEUTIC EXERCISES: CPT

## 2017-04-26 PROCEDURE — 85018 HEMOGLOBIN: CPT | Performed by: ORTHOPAEDIC SURGERY

## 2017-04-26 PROCEDURE — 97150 GROUP THERAPEUTIC PROCEDURES: CPT

## 2017-04-26 PROCEDURE — 80048 BASIC METABOLIC PNL TOTAL CA: CPT | Performed by: ORTHOPAEDIC SURGERY

## 2017-04-26 PROCEDURE — 25010000002 MORPHINE PER 10 MG: Performed by: ORTHOPAEDIC SURGERY

## 2017-04-26 PROCEDURE — 85014 HEMATOCRIT: CPT | Performed by: ORTHOPAEDIC SURGERY

## 2017-04-26 PROCEDURE — 25010000002 KETOROLAC TROMETHAMINE PER 15 MG: Performed by: ORTHOPAEDIC SURGERY

## 2017-04-26 RX ORDER — OXYCODONE AND ACETAMINOPHEN 10; 325 MG/1; MG/1
1 TABLET ORAL EVERY 4 HOURS PRN
Status: DISCONTINUED | OUTPATIENT
Start: 2017-04-26 | End: 2017-04-27 | Stop reason: HOSPADM

## 2017-04-26 RX ORDER — OXYCODONE AND ACETAMINOPHEN 10; 325 MG/1; MG/1
2 TABLET ORAL EVERY 4 HOURS PRN
Status: DISCONTINUED | OUTPATIENT
Start: 2017-04-26 | End: 2017-04-27 | Stop reason: HOSPADM

## 2017-04-26 RX ADMIN — OXYCODONE HYDROCHLORIDE AND ACETAMINOPHEN 2 TABLET: 10; 325 TABLET ORAL at 16:25

## 2017-04-26 RX ADMIN — OXYCODONE HYDROCHLORIDE AND ACETAMINOPHEN 2 TABLET: 5; 325 TABLET ORAL at 08:24

## 2017-04-26 RX ADMIN — OXYCODONE HYDROCHLORIDE AND ACETAMINOPHEN 2 TABLET: 5; 325 TABLET ORAL at 00:11

## 2017-04-26 RX ADMIN — DOCUSATE SODIUM,SENNOSIDES 2 TABLET: 50; 8.6 TABLET, FILM COATED ORAL at 18:10

## 2017-04-26 RX ADMIN — PANTOPRAZOLE SODIUM 40 MG: 40 TABLET, DELAYED RELEASE ORAL at 05:33

## 2017-04-26 RX ADMIN — OXYCODONE HYDROCHLORIDE AND ACETAMINOPHEN 2 TABLET: 5; 325 TABLET ORAL at 04:03

## 2017-04-26 RX ADMIN — DIAZEPAM 5 MG: 5 TABLET ORAL at 11:17

## 2017-04-26 RX ADMIN — CELECOXIB 200 MG: 200 CAPSULE ORAL at 08:24

## 2017-04-26 RX ADMIN — AMLODIPINE BESYLATE 10 MG: 10 TABLET ORAL at 08:24

## 2017-04-26 RX ADMIN — MUPIROCIN: 20 OINTMENT TOPICAL at 08:24

## 2017-04-26 RX ADMIN — METOPROLOL SUCCINATE 50 MG: 50 TABLET, FILM COATED, EXTENDED RELEASE ORAL at 08:24

## 2017-04-26 RX ADMIN — MUPIROCIN: 20 OINTMENT TOPICAL at 18:10

## 2017-04-26 RX ADMIN — KETOROLAC TROMETHAMINE 15 MG: 30 INJECTION, SOLUTION INTRAMUSCULAR at 11:18

## 2017-04-26 RX ADMIN — ASPIRIN 325 MG: 325 TABLET, DELAYED RELEASE ORAL at 08:24

## 2017-04-26 RX ADMIN — OXYCODONE HYDROCHLORIDE AND ACETAMINOPHEN 2 TABLET: 5; 325 TABLET ORAL at 12:27

## 2017-04-26 RX ADMIN — DOCUSATE SODIUM,SENNOSIDES 2 TABLET: 50; 8.6 TABLET, FILM COATED ORAL at 08:24

## 2017-04-26 RX ADMIN — OXYCODONE HYDROCHLORIDE AND ACETAMINOPHEN 2 TABLET: 10; 325 TABLET ORAL at 20:23

## 2017-04-26 RX ADMIN — FERROUS SULFATE TAB 325 MG (65 MG ELEMENTAL FE) 325 MG: 325 (65 FE) TAB at 08:24

## 2017-04-26 RX ADMIN — MORPHINE SULFATE 6 MG: 4 INJECTION, SOLUTION INTRAMUSCULAR; INTRAVENOUS at 13:56

## 2017-04-26 RX ADMIN — CLINDAMYCIN PHOSPHATE 900 MG: 18 INJECTION, SOLUTION INTRAMUSCULAR; INTRAVENOUS at 04:03

## 2017-04-27 VITALS
OXYGEN SATURATION: 97 % | DIASTOLIC BLOOD PRESSURE: 69 MMHG | WEIGHT: 242 LBS | TEMPERATURE: 97.5 F | HEART RATE: 69 BPM | HEIGHT: 70 IN | BODY MASS INDEX: 34.65 KG/M2 | SYSTOLIC BLOOD PRESSURE: 125 MMHG | RESPIRATION RATE: 16 BRPM

## 2017-04-27 LAB
HCT VFR BLD AUTO: 36.3 % (ref 40.4–52.2)
HGB BLD-MCNC: 11.7 G/DL (ref 13.7–17.6)

## 2017-04-27 PROCEDURE — 25010000002 MORPHINE PER 10 MG: Performed by: ORTHOPAEDIC SURGERY

## 2017-04-27 PROCEDURE — 85014 HEMATOCRIT: CPT | Performed by: ORTHOPAEDIC SURGERY

## 2017-04-27 PROCEDURE — 85018 HEMOGLOBIN: CPT | Performed by: ORTHOPAEDIC SURGERY

## 2017-04-27 PROCEDURE — 97150 GROUP THERAPEUTIC PROCEDURES: CPT

## 2017-04-27 PROCEDURE — 25010000002 KETOROLAC TROMETHAMINE PER 15 MG: Performed by: ORTHOPAEDIC SURGERY

## 2017-04-27 PROCEDURE — 97110 THERAPEUTIC EXERCISES: CPT

## 2017-04-27 RX ORDER — PSEUDOEPHEDRINE HCL 30 MG
100 TABLET ORAL 2 TIMES DAILY PRN
Qty: 40 CAPSULE | Refills: 0 | Status: SHIPPED | OUTPATIENT
Start: 2017-04-27 | End: 2019-12-09

## 2017-04-27 RX ORDER — OXYCODONE AND ACETAMINOPHEN 10; 325 MG/1; MG/1
1 TABLET ORAL EVERY 4 HOURS PRN
Qty: 80 TABLET | Refills: 0 | Status: SHIPPED | OUTPATIENT
Start: 2017-04-27 | End: 2019-12-09

## 2017-04-27 RX ADMIN — DOCUSATE SODIUM,SENNOSIDES 2 TABLET: 50; 8.6 TABLET, FILM COATED ORAL at 09:45

## 2017-04-27 RX ADMIN — MORPHINE SULFATE 6 MG: 4 INJECTION, SOLUTION INTRAMUSCULAR; INTRAVENOUS at 11:54

## 2017-04-27 RX ADMIN — FERROUS SULFATE TAB 325 MG (65 MG ELEMENTAL FE) 325 MG: 325 (65 FE) TAB at 09:45

## 2017-04-27 RX ADMIN — ASPIRIN 325 MG: 325 TABLET, DELAYED RELEASE ORAL at 09:45

## 2017-04-27 RX ADMIN — METOPROLOL SUCCINATE 50 MG: 50 TABLET, FILM COATED, EXTENDED RELEASE ORAL at 09:45

## 2017-04-27 RX ADMIN — AMLODIPINE BESYLATE 10 MG: 10 TABLET ORAL at 09:45

## 2017-04-27 RX ADMIN — CELECOXIB 200 MG: 200 CAPSULE ORAL at 09:45

## 2017-04-27 RX ADMIN — OXYCODONE HYDROCHLORIDE AND ACETAMINOPHEN 2 TABLET: 10; 325 TABLET ORAL at 00:25

## 2017-04-27 RX ADMIN — PANTOPRAZOLE SODIUM 40 MG: 40 TABLET, DELAYED RELEASE ORAL at 06:13

## 2017-04-27 RX ADMIN — OXYCODONE HYDROCHLORIDE AND ACETAMINOPHEN 2 TABLET: 10; 325 TABLET ORAL at 15:08

## 2017-04-27 RX ADMIN — OXYCODONE HYDROCHLORIDE AND ACETAMINOPHEN 2 TABLET: 10; 325 TABLET ORAL at 04:19

## 2017-04-27 RX ADMIN — OXYCODONE HYDROCHLORIDE AND ACETAMINOPHEN 2 TABLET: 10; 325 TABLET ORAL at 08:32

## 2017-04-27 RX ADMIN — KETOROLAC TROMETHAMINE 15 MG: 30 INJECTION, SOLUTION INTRAMUSCULAR at 11:55

## 2017-04-27 RX ADMIN — MUPIROCIN: 20 OINTMENT TOPICAL at 09:45

## 2018-11-26 NOTE — DISCHARGE INSTRUCTIONS
2% CHLORAHEXIDINE GLUCONATE* CLOTH  Preparing or “prepping” skin before surgery can reduce the risk of infection at the surgical site. To make the process easier, [facility] has chosen disposable cloths moistened with a rinse-free, 2% Chlorhexidine Gluconate (CHG) antiseptic solution. The steps below outline the prepping process and should be carefully followed.        Use the prep cloth on the area that is circled in the diagram             Directions Night before Surgery  1) Shower using a fresh bar of anti-bacterial soap (such as Dial) and clean washcloth.  Use a clean towel to completely dry your skin.  2) Do not use any lotions, oils or creams on your skin.  3) Open the package and remove 1 cloth, wipe your skin for 30 seconds in a circular motion.  Allow to dry for 3 minutes.  4) Repeat #3 with second cloth.  5) Do not touch your eyes, ears, or mouth with the prep cloth.  6) Allow the wet prep solution to air dry.  7) Discard the prep cloth and wash your hands with soap and water.   8) Dress in clean bed clothes and sleep on fresh clean bed sheets.   9) You may experience some temporary itching after the prep.    Directions Day of Surgery  1) Repeat steps 1,2,3,4,5,6,7, and 9.   2) Dress in clean clothes before coming to the hospital.    BACTROBAN NASAL OINTMENT  There are many germs normally in your nose. Bactroban is an ointment that will help reduce these germs. Please follow these instructions for Bactroban use:    ____Two days before surgery in the evening Date________    ____The day before surgery in the morning  Date________    ____The day before surgery in the evening              Date________    ____The day of surgery in the morning    Date________    **Squirt ½ package of Bactroban Ointment onto a cotton applicator and apply to inside of 1st nostril.  Squirt the remaining Bactroban and apply to the inside of the other nostril.    PERIDEX- ORAL:  Use only if your surgeon has  ordered  Use the night before and morning of surgery - Swish, gargle, and spit - do not swallow.Take the following medications the morning of surgery with a small sip of water:   AMLODIPINE, METOPROLOL, OMEPRAZOLE    ARRIVE AT 730AM      General Instructions:  • Do not eat solid food after midnight the night before surgery.  • You may drink clear liquids day of surgery but must stop at least one hour before your hospital arrival time.  • It is beneficial for you to have a clear drink that contains carbohydrates the day of surgery.  We suggest a 20 ounce bottle of Gatorade or Powerade for non-diabetic patients or a 20 ounce bottle of G2 or Powerade Zero for diabetic patients. (Pediatric patients, are not advised to drink a 20 ounce carbohydrate drink)    Clear liquids are liquids you can see through.  Nothing red in color.     Plain water                               Sports drinks  Sodas                                   Gelatin (Jell-O)  Fruit juices without pulp such as white grape juice and apple juice  Popsicles that contain no fruit or yogurt  Tea or coffee (no cream or milk added)  Gatorade / Powerade  G2 / Powerade Zero    • Infants may have breast milk up to four hours before surgery.  • Infants drinking formula may drink formula up to six hours before surgery.   • Patients who avoid smoking, chewing tobacco and alcohol for 4 weeks prior to surgery have a reduced risk of post-operative complications.  Quit smoking as many days before surgery as you can.  • Do not smoke, use chewing tobacco or drink alcohol the day of surgery.   • If applicable bring your C-PAP/ BI-PAP machine.  • Bring any papers given to you in the doctor’s office.  • Wear clean comfortable clothes and socks.  • Do not wear contact lenses or make-up.  Bring a case for your glasses.   • Bring crutches or walker if applicable.  • Leave all other valuables and jewelry at home.  • The Pre-Admission Testing nurse will instruct you to bring  medications if unable to obtain an accurate list in Pre-Admission Testing.        If you were given a blood bank ID arm band remember to bring it with you the day of surgery.    Preventing a Surgical Site Infection:  • For 2 to 3 days before surgery, avoid shaving with a razor because the razor can irritate skin and make it easier to develop an infection.  • The night prior to surgery sleep in a clean bed with clean clothing.  Do not allow pets to sleep with you.  • Shower on the morning of surgery using a fresh bar of anti-bacterial soap (such as Dial) and clean washcloth.  Dry with a clean towel and dress in clean clothing.  • Ask your surgeon if you will be receiving antibiotics prior to surgery.  • Make sure you, your family, and all healthcare providers clean their hands with soap and water or an alcohol based hand  before caring for you or your wound.    Day of surgery:  Upon arrival, a Pre-op nurse and Anesthesiologist will review your health history, obtain vital signs, and answer questions you may have.  The only belongings needed at this time will be your home medications and if applicable your C-PAP/BI-PAP machine.  If you are staying overnight your family can leave the rest of your belongings in the car and bring them to your room later.  A Pre-op nurse will start an IV and you may receive medication in preparation for surgery, including something to help you relax.  Your family will be able to see you in the Pre-op area.  While you are in surgery your family should notify the waiting room  if they leave the waiting room area and provide a contact phone number.    Please be aware that surgery does come with discomfort.  We want to make every effort to control your discomfort so please discuss any uncontrolled symptoms with your nurse.   Your doctor will most likely have prescribed pain medications.      If you are going home after surgery you will receive individualized written care  instructions before being discharged.  A responsible adult must drive you to and from the hospital on the day of your surgery and stay with you for 24 hours.    If you are staying overnight following surgery, you will be transported to your hospital room following the recovery period.  [facility] has all private rooms.    If you have any questions please call Pre-Admission Testing at [phone].  Deductibles and co-payments are collected on the day of service. Please be prepared to pay the required co-pay, deductible or deposit on the day of service as defined by your plan.   unable to obtain height and weight.  pt had ekg done and brought directly to the gold area

## 2019-08-07 ENCOUNTER — OFFICE VISIT (OUTPATIENT)
Dept: CARDIOLOGY | Facility: CLINIC | Age: 64
End: 2019-08-07

## 2019-08-07 VITALS
DIASTOLIC BLOOD PRESSURE: 68 MMHG | BODY MASS INDEX: 35.02 KG/M2 | SYSTOLIC BLOOD PRESSURE: 128 MMHG | WEIGHT: 244.6 LBS | HEART RATE: 47 BPM | HEIGHT: 70 IN

## 2019-08-07 DIAGNOSIS — I10 ESSENTIAL HYPERTENSION: ICD-10-CM

## 2019-08-07 DIAGNOSIS — R42 POSTURAL DIZZINESS WITH PRESYNCOPE: Primary | ICD-10-CM

## 2019-08-07 DIAGNOSIS — R55 POSTURAL DIZZINESS WITH PRESYNCOPE: Primary | ICD-10-CM

## 2019-08-07 PROCEDURE — 99204 OFFICE O/P NEW MOD 45 MIN: CPT | Performed by: INTERNAL MEDICINE

## 2019-08-07 PROCEDURE — 93000 ELECTROCARDIOGRAM COMPLETE: CPT | Performed by: INTERNAL MEDICINE

## 2019-08-07 RX ORDER — TOPIRAMATE 25 MG/1
TABLET ORAL
COMMUNITY
Start: 2019-07-30 | End: 2021-12-09

## 2019-08-07 RX ORDER — SUMATRIPTAN 100 MG/1
TABLET, FILM COATED ORAL
COMMUNITY
Start: 2019-07-30 | End: 2021-12-09

## 2019-08-07 RX ORDER — LISINOPRIL AND HYDROCHLOROTHIAZIDE 12.5; 1 MG/1; MG/1
TABLET ORAL
COMMUNITY
Start: 2019-07-30 | End: 2021-12-09

## 2019-08-07 NOTE — PROGRESS NOTES
[facility]      Patient Name: Chris Brown Jr.  :1955  Age: 63 y.o.  Encounter Provider:  Justus Pemberton Jr, MD      Chief Complaint: Postural dizziness and shortness of breath      HPI  Chris Brown Jr. is a 63 y.o. male past medical history of C-spine fracture, chronic headache presents for evaluation of postural dizziness and shortness of air.  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  He ended up fracturing C1 and has had a host of neurological complaints since that time.  Over the past year he has noted increased postural dizziness and shortness of air with exertion.  He states that the dizziness comes and goes randomly with some relationship to exertion.  He notes that this improves with rest.  The first year after his accident he was quite sedentary and gained about 40 pounds but over the last year he has tried to increase his exercise tolerance.  He states that if he walks longer than 1/4 to 1/2 mile that he will begin to feel dizzy and have what he feels his shortness of air out of proportion to level of exertion.  He does note occasional sharp substernal chest pain without radiation associated with exertion.  He has no cardiac history and has not undergone previous cardiac testing.  Lifelong non-smoker denies alcohol or illicit drug use.  He did note remedy edema approximately 1 month ago which completely resolved with stopping amlodipine.      The following portions of the patient's history were reviewed and updated as appropriate: allergies, current medications, past family history, past medical history, past social history, past surgical history and problem list.      Review of Systems   Constitution: Negative for chills and fever.   HENT: Negative for hoarse voice and sore throat.    Eyes: Negative for double vision and photophobia.   Cardiovascular: Positive for  "chest pain, dyspnea on exertion, near-syncope and palpitations. Negative for leg swelling, orthopnea, paroxysmal nocturnal dyspnea and syncope.   Respiratory: Negative for cough and wheezing.    Skin: Negative for poor wound healing and rash.   Musculoskeletal: Negative for arthritis and joint swelling.   Gastrointestinal: Negative for bloating, abdominal pain, hematemesis and hematochezia.   Neurological: Positive for dizziness. Negative for focal weakness.   Psychiatric/Behavioral: Negative for depression and suicidal ideas.       OBJECTIVE:   Vital Signs  Vitals:    08/07/19 1502   BP: 128/68   Pulse:      Estimated body mass index is 35.1 kg/m² as calculated from the following:    Height as of this encounter: 177.8 cm (70\").    Weight as of this encounter: 111 kg (244 lb 9.6 oz).    Physical Exam      ECG 12 Lead  Date/Time: 8/7/2019 3:44 PM  Performed by: Justus Pemberton Jr., MD  Authorized by: Justus Pemberton Jr., MD   Comparison: compared with previous ECG from 7/30/2019  Similar to previous ECG  Rhythm: sinus bradycardia  Other findings: non-specific ST-T wave changes    Clinical impression: abnormal EKG            Cardiac Procedures:  No previous testing        ASSESSMENT:      Diagnosis Plan   1. Postural dizziness with presyncope  Holter Monitor - 24 Hour   2. Essential hypertension           PLAN OF CARE:     1. Presyncope -given exertional components and associated chest pain we will be checking a dobutamine stress echo.  Given his gait instability and disequilibrium he cannot walk on the treadmill at this time.  It is possible that given his resting bradycardia there is chronotropic incompetence.  We will be checking a Holter monitor for 24 hours and I have asked him to engage in physical activity for which she is comfortable though that we can correlate heart rate response to exercise.  We may have to decrease or stop beta-blocker therapy and gauge clinical response.  2. Chest pain/soa -pharmacological " stress echo    Return to clinic 3 months         Discharge Medications           Accurate as of 8/7/19  3:39 PM. If you have any questions, ask your nurse or doctor.               Continue These Medications      Instructions Start Date   amLODIPine 10 MG tablet  Commonly known as:  NORVASC   10 mg, Oral, Daily      aspirin 325 MG EC tablet   325 mg, Oral, 2 Times Daily With Meals      baclofen 10 MG tablet  Commonly known as:  LIORESAL   5 mg, Oral, Every 8 Hours PRN      CENTRUM SILVER PO   1 tablet, Oral, Daily      docusate sodium 100 MG capsule   100 mg, Oral, 2 Times Daily PRN      gabapentin 100 MG capsule  Commonly known as:  NEURONTIN   100 mg, Oral, Every 8 Hours PRN      lisinopril-hydrochlorothiazide 10-12.5 MG per tablet  Commonly known as:  PRINZIDE,ZESTORETIC   No dose, route, or frequency recorded.      metoprolol succinate XL 50 MG 24 hr tablet  Commonly known as:  TOPROL-XL   50 mg, Oral, Daily      omeprazole 20 MG capsule  Commonly known as:  priLOSEC   20 mg, Oral, Daily      oxyCODONE 10 MG tablet  Commonly known as:  ROXICODONE   10 mg, Oral, Every 4 Hours PRN      oxyCODONE-acetaminophen  MG per tablet  Commonly known as:  PERCOCET   1 tablet, Oral, Every 4 Hours PRN      SUMAtriptan 100 MG tablet  Commonly known as:  IMITREX   No dose, route, or frequency recorded.      topiramate 25 MG tablet  Commonly known as:  TOPAMAX   No dose, route, or frequency recorded.             Thank you for allowing me to participate in the care of your patient,      Sincerely,   Justus Pemberton Jr, MD  [facility]  08/07/19  3:39 PM    **Sera Disclaimer:**  Much of this encounter note is an electronic transcription/translation of spoken language to printed text. The electronic translation of spoken language may permit erroneous, or at times, nonsensical words or phrases to be inadvertently transcribed. Although I have reviewed the note for such errors, some may still exist.

## 2019-08-13 ENCOUNTER — TELEPHONE (OUTPATIENT)
Dept: CARDIOLOGY | Facility: CLINIC | Age: 64
End: 2019-08-13

## 2019-08-13 NOTE — TELEPHONE ENCOUNTER
Patient is returning your call from this morning.  Please call him with the holter results.  Patient can be reached at [phone]/ ann

## 2019-08-14 ENCOUNTER — TELEPHONE (OUTPATIENT)
Dept: CARDIOLOGY | Facility: CLINIC | Age: 64
End: 2019-08-14

## 2019-08-14 RX ORDER — METOPROLOL SUCCINATE 50 MG/1
75 TABLET, EXTENDED RELEASE ORAL DAILY
Qty: 30 TABLET | Refills: 3 | Status: SHIPPED | OUTPATIENT
Start: 2019-08-14

## 2019-08-14 NOTE — TELEPHONE ENCOUNTER
----- Message from Justus Pemberton Jr., MD sent at 8/14/2019 12:35 PM EDT -----  Spoke to patient about abnormal Holter monitor.  Increased metoprolol to 75 mg.  Follow-up as scheduled.

## 2019-12-09 ENCOUNTER — OFFICE VISIT (OUTPATIENT)
Dept: CARDIOLOGY | Facility: CLINIC | Age: 64
End: 2019-12-09

## 2019-12-09 VITALS
SYSTOLIC BLOOD PRESSURE: 108 MMHG | BODY MASS INDEX: 36.08 KG/M2 | RESPIRATION RATE: 18 BRPM | HEIGHT: 70 IN | HEART RATE: 76 BPM | OXYGEN SATURATION: 93 % | WEIGHT: 252 LBS | DIASTOLIC BLOOD PRESSURE: 58 MMHG

## 2019-12-09 DIAGNOSIS — I10 ESSENTIAL HYPERTENSION: Primary | ICD-10-CM

## 2019-12-09 DIAGNOSIS — R06.09 DOE (DYSPNEA ON EXERTION): ICD-10-CM

## 2019-12-09 PROCEDURE — 99213 OFFICE O/P EST LOW 20 MIN: CPT | Performed by: INTERNAL MEDICINE

## 2019-12-09 RX ORDER — ASPIRIN 81 MG/1
81 TABLET, CHEWABLE ORAL DAILY
COMMUNITY

## 2019-12-09 NOTE — PROGRESS NOTES
Episodic dizziness      [facility]      Patient Name: Chris Brown Jr.  :1955  Age: 64 y.o.  Encounter Provider:  Justus Pemberton Jr, MD      Chief Complaint: Postural dizziness and shortness of breath      HPI  Chris Brown Jr. is a 64 y.o. male past medical history of C-spine fracture, chronic headache presents for evaluation of postural dizziness and shortness of air.  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  He ended up fracturing C1 and has had a host of neurological complaints since that time.  Over the past year he has noted increased postural dizziness and shortness of air with exertion.  He states that the dizziness comes and goes randomly with some relationship to exertion.  He notes that this improves with rest.  The first year after his accident he was quite sedentary and gained about 40 pounds but over the last year he has tried to increase his exercise tolerance.  He states that if he walks longer than 1/4 to 1/2 mile that he will begin to feel dizzy and have what he feels his shortness of air out of proportion to level of exertion.  He does note occasional sharp substernal chest pain without radiation associated with exertion.  He has no cardiac history and has not undergone previous cardiac testing.  Lifelong non-smoker denies alcohol or illicit drug use.  He did note remedy edema approximately 1 month ago which completely resolved with stopping amlodipine.    Episodic dizziness complaint at last visit investigated by Holter monitor which showed single episode of SVT lasting for 8 beats.  On beta-blocker therapy patient has not experienced any further episodic dizziness.  He does complain of worsening dyspnea on exertion which is been chronic over the last year but increased over the last 6 months.  He denies any chest pain.  No orthopnea PND or edema.  His functional  "capacity is limited by degenerativel disc disease.    The following portions of the patient's history were reviewed and updated as appropriate: allergies, current medications, past family history, past medical history, past social history, past surgical history and problem list.      Review of Systems   Constitution: Positive for weight gain. Negative for chills and fever.   HENT: Negative for hoarse voice and sore throat.    Eyes: Negative for double vision and photophobia.   Cardiovascular: Negative for chest pain, dyspnea on exertion, leg swelling, near-syncope, orthopnea, palpitations, paroxysmal nocturnal dyspnea and syncope.   Respiratory: Positive for shortness of breath and snoring. Negative for cough and wheezing.    Skin: Negative for poor wound healing and rash.   Musculoskeletal: Negative for arthritis and joint swelling.   Gastrointestinal: Negative for bloating, abdominal pain, hematemesis and hematochezia.   Neurological: Positive for dizziness, headaches and light-headedness. Negative for focal weakness.   Psychiatric/Behavioral: Negative for depression and suicidal ideas.   All other systems reviewed and are negative.      OBJECTIVE:   Vital Signs  Vitals:    12/09/19 1336   BP: 108/58   Pulse: 76   Resp: 18   SpO2: 93%     Estimated body mass index is 36.16 kg/m² as calculated from the following:    Height as of this encounter: 177.8 cm (70\").    Weight as of this encounter: 114 kg (252 lb).    Physical Exam   Constitutional: He is oriented to person, place, and time. He appears well-developed and well-nourished.   HENT:   Head: Normocephalic and atraumatic.   Eyes: Pupils are equal, round, and reactive to light. Conjunctivae are normal.   Neck: No JVD present. No thyromegaly present.   Cardiovascular: Exam reveals no gallop and no friction rub.   No murmur heard.  Pulmonary/Chest: No respiratory distress. He exhibits no tenderness.   Abdominal: Bowel sounds are normal. He exhibits no distension. "   Musculoskeletal: He exhibits no edema or tenderness.   Neurological: He is alert and oriented to person, place, and time.   Skin: No rash noted. No erythema.   Psychiatric: He has a normal mood and affect. Judgment normal.   Vitals reviewed.            ASSESSMENT:      Diagnosis Plan   1. Essential hypertension           PLAN OF CARE:     1. Presyncope -no further symptoms on beta-blocker therapy.  Singular episode which was a short run of SVT on Holter monitor.  We will continue to monitor symptoms.  2. Chest pain/soa -pharmacological stress study ordered.    Return to clinic 6 months         Discharge Medications           Accurate as of December 9, 2019  1:40 PM. If you have any questions, ask your nurse or doctor.               Changes to Medications      Instructions Start Date   aspirin 81 MG chewable tablet  What changed:  Another medication with the same name was removed. Continue taking this medication, and follow the directions you see here.  Changed by:  Justus Pemberton Jr, MD   81 mg, Oral, Daily         Continue These Medications      Instructions Start Date   CENTRUM SILVER PO   1 tablet, Oral, Daily      gabapentin 600 MG tablet  Commonly known as:  NEURONTIN   100 mg, Oral, Every 8 Hours PRN      lisinopril-hydrochlorothiazide 10-12.5 MG per tablet  Commonly known as:  PRINZIDE,ZESTORETIC   No dose, route, or frequency recorded.      metoprolol succinate XL 50 MG 24 hr tablet  Commonly known as:  TOPROL-XL   75 mg, Oral, Daily      SUMAtriptan 100 MG tablet  Commonly known as:  IMITREX   No dose, route, or frequency recorded.      topiramate 25 MG tablet  Commonly known as:  TOPAMAX   No dose, route, or frequency recorded.         Stop These Medications    baclofen 10 MG tablet  Commonly known as:  LIORESAL  Stopped by:  Justus Pemberton Jr, MD     docusate sodium 100 MG capsule  Stopped by:  Justus Pemberton Jr, MD     omeprazole 20 MG capsule  Commonly known as:  priLOSEC  Stopped by:  Justus Pemberton Jr  MD     oxyCODONE 10 MG tablet  Commonly known as:  ROXICODONE  Stopped by:  Justus Pemberton Jr, MD     oxyCODONE-acetaminophen  MG per tablet  Commonly known as:  PERCOCET  Stopped by:  Justus Pemberton Jr, MD            Thank you for allowing me to participate in the care of your patient,      Sincerely,   Justus Pemberton MD  [facility]  12/09/19  1:40 PM    **Dragon Disclaimer:**  Much of this encounter note is an electronic transcription/translation of spoken language to printed text. The electronic translation of spoken language may permit erroneous, or at times, nonsensical words or phrases to be inadvertently transcribed. Although I have reviewed the note for such errors, some may still exist.

## 2019-12-17 ENCOUNTER — HOSPITAL ENCOUNTER (OUTPATIENT)
Dept: CARDIOLOGY | Facility: HOSPITAL | Age: 64
Discharge: HOME | End: 2019-12-17
Admitting: INTERNAL MEDICINE

## 2019-12-17 VITALS — WEIGHT: 251.32 LBS | BODY MASS INDEX: 35.98 KG/M2 | HEIGHT: 70 IN

## 2019-12-17 DIAGNOSIS — R06.09 DOE (DYSPNEA ON EXERTION): ICD-10-CM

## 2019-12-17 LAB
BH CV NUCLEAR PRIOR STUDY: 2
BH CV STRESS BP STAGE 1: NORMAL
BH CV STRESS COMMENTS STAGE 1: NORMAL
BH CV STRESS DOSE REGADENOSON STAGE 1: 0.4
BH CV STRESS DURATION MIN STAGE 1: 0
BH CV STRESS DURATION SEC STAGE 1: 10
BH CV STRESS HR STAGE 1: 80
BH CV STRESS PROTOCOL 1: NORMAL
BH CV STRESS RECOVERY BP: NORMAL MMHG
BH CV STRESS RECOVERY HR: 68 BPM
BH CV STRESS STAGE 1: 1
LV EF NUC BP: 69 %
MAXIMAL PREDICTED HEART RATE: 157 BPM
PERCENT MAX PREDICTED HR: 50.96 %
STRESS BASELINE BP: NORMAL MMHG
STRESS BASELINE HR: 62 BPM
STRESS PERCENT HR: 60 %
STRESS POST EXERCISE DUR SEC: 10 SEC
STRESS POST PEAK BP: NORMAL MMHG
STRESS POST PEAK HR: 80 BPM
STRESS TARGET HR: 133 BPM

## 2019-12-17 PROCEDURE — 25010000002 REGADENOSON 0.4 MG/5ML SOLUTION: Performed by: INTERNAL MEDICINE

## 2019-12-17 PROCEDURE — A9555 RB82 RUBIDIUM: HCPCS | Performed by: INTERNAL MEDICINE

## 2019-12-17 PROCEDURE — 93017 CV STRESS TEST TRACING ONLY: CPT

## 2019-12-17 PROCEDURE — 93018 CV STRESS TEST I&R ONLY: CPT | Performed by: INTERNAL MEDICINE

## 2019-12-17 PROCEDURE — 93016 CV STRESS TEST SUPVJ ONLY: CPT | Performed by: INTERNAL MEDICINE

## 2019-12-17 PROCEDURE — 0 RUBIDIUM CHLORIDE: Performed by: INTERNAL MEDICINE

## 2019-12-17 PROCEDURE — 78492 MYOCRD IMG PET MLT RST&STRS: CPT | Performed by: INTERNAL MEDICINE

## 2019-12-17 PROCEDURE — 78492 MYOCRD IMG PET MLT RST&STRS: CPT

## 2019-12-17 RX ADMIN — RUBIDIUM CHLORIDE RB-82 1 DOSE: 150 INJECTION, SOLUTION INTRAVENOUS at 09:35

## 2019-12-17 RX ADMIN — REGADENOSON 0.4 MG: 0.08 INJECTION, SOLUTION INTRAVENOUS at 09:45

## 2019-12-17 RX ADMIN — RUBIDIUM CHLORIDE RB-82 1 DOSE: 150 INJECTION, SOLUTION INTRAVENOUS at 09:45

## 2021-02-13 ENCOUNTER — APPOINTMENT (OUTPATIENT)
Dept: GENERAL RADIOLOGY | Facility: HOSPITAL | Age: 66
End: 2021-02-13

## 2021-02-13 PROCEDURE — 73130 X-RAY EXAM OF HAND: CPT | Performed by: FAMILY MEDICINE

## 2021-02-13 PROCEDURE — 73110 X-RAY EXAM OF WRIST: CPT | Performed by: FAMILY MEDICINE

## 2021-03-22 ENCOUNTER — BULK ORDERING (OUTPATIENT)
Dept: CASE MANAGEMENT | Facility: OTHER | Age: 66
End: 2021-03-22

## 2021-03-22 DIAGNOSIS — Z23 IMMUNIZATION DUE: ICD-10-CM

## 2021-09-22 ENCOUNTER — TRANSCRIBE ORDERS (OUTPATIENT)
Dept: ADMINISTRATIVE | Facility: HOSPITAL | Age: 66
End: 2021-09-22

## 2021-09-22 DIAGNOSIS — M25.512 LEFT SHOULDER PAIN, UNSPECIFIED CHRONICITY: Primary | ICD-10-CM

## 2021-12-01 ENCOUNTER — HOSPITAL ENCOUNTER (OUTPATIENT)
Dept: CT IMAGING | Facility: HOSPITAL | Age: 66
Discharge: HOME | End: 2021-12-01
Admitting: ORTHOPAEDIC SURGERY

## 2021-12-01 DIAGNOSIS — M25.512 LEFT SHOULDER PAIN, UNSPECIFIED CHRONICITY: ICD-10-CM

## 2021-12-01 PROCEDURE — 73200 CT UPPER EXTREMITY W/O DYE: CPT

## 2021-12-02 ENCOUNTER — APPOINTMENT (OUTPATIENT)
Dept: PREADMISSION TESTING | Facility: HOSPITAL | Age: 66
End: 2021-12-02

## 2021-12-09 ENCOUNTER — PRE-ADMISSION TESTING (OUTPATIENT)
Dept: PREADMISSION TESTING | Facility: HOSPITAL | Age: 66
End: 2021-12-09

## 2021-12-09 ENCOUNTER — HOSPITAL ENCOUNTER (OUTPATIENT)
Dept: GENERAL RADIOLOGY | Facility: HOSPITAL | Age: 66
Discharge: HOME | End: 2021-12-09

## 2021-12-09 VITALS
HEIGHT: 70 IN | WEIGHT: 244.1 LBS | OXYGEN SATURATION: 96 % | DIASTOLIC BLOOD PRESSURE: 68 MMHG | RESPIRATION RATE: 16 BRPM | HEART RATE: 79 BPM | BODY MASS INDEX: 34.95 KG/M2 | SYSTOLIC BLOOD PRESSURE: 128 MMHG | TEMPERATURE: 98.8 F

## 2021-12-09 LAB
ABO GROUP BLD: NORMAL
ANION GAP SERPL CALCULATED.3IONS-SCNC: 10.9 MMOL/L (ref 5–15)
BLD GP AB SCN SERPL QL: NEGATIVE
BUN SERPL-MCNC: 14 MG/DL (ref 8–23)
BUN/CREAT SERPL: 23.7 (ref 7–25)
CALCIUM SPEC-SCNC: 9.4 MG/DL (ref 8.6–10.5)
CHLORIDE SERPL-SCNC: 99 MMOL/L (ref 98–107)
CO2 SERPL-SCNC: 26.1 MMOL/L (ref 22–29)
CREAT SERPL-MCNC: 0.59 MG/DL (ref 0.76–1.27)
DEPRECATED RDW RBC AUTO: 53.5 FL (ref 37–54)
ERYTHROCYTE [DISTWIDTH] IN BLOOD BY AUTOMATED COUNT: 15.7 % (ref 12.3–15.4)
GFR SERPL CREATININE-BSD FRML MDRD: 138 ML/MIN/1.73
GLUCOSE SERPL-MCNC: 117 MG/DL (ref 65–99)
HCT VFR BLD AUTO: 47.1 % (ref 37.5–51)
HGB BLD-MCNC: 14.6 G/DL (ref 13–17.7)
MCH RBC QN AUTO: 28.6 PG (ref 26.6–33)
MCHC RBC AUTO-ENTMCNC: 31 G/DL (ref 31.5–35.7)
MCV RBC AUTO: 92.2 FL (ref 79–97)
PLATELET # BLD AUTO: 126 10*3/MM3 (ref 140–450)
PMV BLD AUTO: 10 FL (ref 6–12)
POTASSIUM SERPL-SCNC: 4.4 MMOL/L (ref 3.5–5.2)
QT INTERVAL: 425 MS
RBC # BLD AUTO: 5.11 10*6/MM3 (ref 4.14–5.8)
RH BLD: POSITIVE
SODIUM SERPL-SCNC: 136 MMOL/L (ref 136–145)
T&S EXPIRATION DATE: NORMAL
WBC NRBC COR # BLD: 7.3 10*3/MM3 (ref 3.4–10.8)

## 2021-12-09 PROCEDURE — 93005 ELECTROCARDIOGRAM TRACING: CPT

## 2021-12-09 PROCEDURE — 85027 COMPLETE CBC AUTOMATED: CPT

## 2021-12-09 PROCEDURE — 86850 RBC ANTIBODY SCREEN: CPT

## 2021-12-09 PROCEDURE — 80048 BASIC METABOLIC PNL TOTAL CA: CPT

## 2021-12-09 PROCEDURE — 73030 X-RAY EXAM OF SHOULDER: CPT

## 2021-12-09 PROCEDURE — 36415 COLL VENOUS BLD VENIPUNCTURE: CPT

## 2021-12-09 PROCEDURE — 93010 ELECTROCARDIOGRAM REPORT: CPT | Performed by: INTERNAL MEDICINE

## 2021-12-09 PROCEDURE — 86900 BLOOD TYPING SEROLOGIC ABO: CPT

## 2021-12-09 PROCEDURE — 86901 BLOOD TYPING SEROLOGIC RH(D): CPT

## 2021-12-09 RX ORDER — CHLORHEXIDINE GLUCONATE 500 MG/1
CLOTH TOPICAL TAKE AS DIRECTED
COMMUNITY
End: 2021-12-16 | Stop reason: HOSPADM

## 2021-12-09 RX ORDER — GABAPENTIN 600 MG/1
600 TABLET ORAL 3 TIMES DAILY
COMMUNITY

## 2021-12-09 RX ORDER — FUROSEMIDE 40 MG/1
40 TABLET ORAL DAILY
COMMUNITY

## 2021-12-09 RX ORDER — AMLODIPINE BESYLATE 10 MG/1
TABLET ORAL
COMMUNITY
Start: 2021-08-03

## 2021-12-09 RX ORDER — POTASSIUM CHLORIDE 20 MEQ/1
20 TABLET, EXTENDED RELEASE ORAL DAILY
COMMUNITY
Start: 2021-06-23

## 2021-12-09 NOTE — DISCHARGE INSTRUCTIONS

## 2021-12-13 ENCOUNTER — TELEPHONE (OUTPATIENT)
Dept: ORTHOPEDIC SURGERY | Facility: HOSPITAL | Age: 66
End: 2021-12-13

## 2021-12-13 NOTE — TELEPHONE ENCOUNTER
Risk Factor yes no   Age >75  X   BMI <20 >40  X   Patient History     Chronic Pain (2 or more meds/Pain Management)  X   ETOH (more than 3 drinks Daily)  X   Uncontrolled Depression/Bipolar/Schizoaffective Disorder  X   Arrhythmias  X   Stent placement/MI  X   DVT/PE  X   Pacemaker  X   HTN (uncontrolled or requiring more than 2 medications)  X   CHF/Retained fluids/Edema X    Stroke with Residual   X   COPD/Asthma  X   WILLIE--Non-compliant with CPAP  X   Diabetes (on insulin or more than 2 meds)         A1C:  X   BPH/Urinary retention (on medication)  X   CKD  X   Home environment and support     Current ambulation status (use of cane, walker, W/C, Multiple falls/weakness)  X   Stairs to enter and throughout home X    Lives Alone  X   Doesn’t have support at home  X     Outpatient Screening Assessment    Home needs: (Walker/BSC):  Total shoulder--no equipment needed   ? Steps 4 steps to get into the house with [place]  Caregiver 24-48hrs post-discharge: Home with wife     Discharge Plan:  Total Shoulder     Prescriptions: Meds to bed    Home medications:   ? Blood thinner/anti-coag therapy--ASA  ? BPH or diuretic--Lasix  ? BP meds--Norvasc, Metoprolol  ? Pain/Anti-inflammatories--Gabapentin   Pre-op Education:  Educate patient on spinal anesthesia/pain control:  ? patient verbalize understanding    Educate patient on hospital course/timeline:  ?  patient verbalize understanding    Joint Care Class:  ?  yes ? no      Notes:

## 2021-12-14 ENCOUNTER — LAB (OUTPATIENT)
Dept: LAB | Facility: HOSPITAL | Age: 66
End: 2021-12-14

## 2021-12-14 LAB — SARS-COV-2 ORF1AB RESP QL NAA+PROBE: NOT DETECTED

## 2021-12-14 PROCEDURE — U0004 COV-19 TEST NON-CDC HGH THRU: HCPCS

## 2021-12-14 PROCEDURE — C9803 HOPD COVID-19 SPEC COLLECT: HCPCS

## 2021-12-14 NOTE — H&P
HPI  Chief complaint left shoulder pain and weakness.  65-year-old  male is here to discuss left shoulder.  He had a chronic full thickness large retracted rotator cuff tear sustained 5 years ago.  He also had a C1 fracture that was treated nonoperatively and had successful treatment.  He could not have the shoulder worked on at that time and he has treated conservatively.  He would like to have definitive care because he has ongoing pain that inhibits his functional activities and wakes him at night.  He has pain that inhibits him from doing anything at or above shoulder level.  He cannot hold any objects of significant weight.  Review of Systems:  Positive for: Decreased Motion, Joint Pain and Headaches.    Patient denies: Abdominal Pain, Bleeding, Chest Pain, Convulsions/Seizure, Depression, Difficulty Swallowing, Easy Bruisability, Emotional Disturbances, Eyes or Vision Problems, Fecal Incontinence, Fever/Chills, Increased Thirst, Increased Hunger, Insomnia, Nausea/Vomiting, Night Sweats, Poor Balance, Persistent Cough, Rash, Shortness of Breath, Shortness of Breath While Lying down, Skin Problems, Urinary Retention and Weakness.  Allergies:  * metal - negative  Medications:  klor-con m20 20 meq tablet,er particles/crystals (potassium chloride)   furosemide 40 mg tablet (furosemide)   amlodipine 10 mg tablet (amlodipine)   metoprolol succinate 50 mg tablet extended release 24 hr (metoprolol succinate)   gabapentin 600 mg tablet (gabapentin) 4 times a day  amitriptyline hcl tablet (amitriptyline hcl tabs)   furosemide tablet (furosemide tabs)   sumatriptan succinate solution (sumatriptan succinate soln)   gabapentin capsule (gabapentin caps)   amlodipine besylate tablet (amlodipine besylate tabs)   metoprolol tartrate tablet (metoprolol tartrate tabs)   Patient History of:  TINITIS  BLOOD CLOTS/EMBOLISM - NEGATIVE  HYPERTENSION  Surgical History:  C1 FRACTURE-   APPENDED-   APPENDED-   Cervical  Fusion-posterior-[CPT-00448]   LT TKR-   APPENDED-   APPENDED-   Tonsillectomy*-[CPT-85962]   right Total Knee Arthroplasty-[CPT-70486]   Known Family History of:  Negative  Social History:  Social history taken on 09/20/2021 states LISSETT RAMÍREZ JR is a  65 year old male.  He has never used tobacco products.      Past medical, social, family histories and ROS reviewed today with the patient and changes documented in the chart (09/20/2021).  PCP Dr. CARLA WILLIS, NANDINI SEYMOUR    Physical Exam  Height:  70 in.    Weight:  235 lbs.     BMI:  33.84    Mental/HEENT/Cardio/Skin  The patient's general appearance is well nourished, well hydrated, no acute distress.  Orientation is alert and oriented x 3.  The patient's mood is normal.  Pulmonary exam shows normal air exchange, no labored breathing, or shortness of breath.  A skin exam shows normal temperature and color in the area of examination.  A lymphatic exam reveals no adenopathy in the area of examination.      Right Shoulder      Left Shoulder  Skin is normal.  There is no atrophy.  There is no effusion.  There is no warmth.  No erythema.  Lymphadenopathy is negative.  Left shoulder active ROM today shows: Elevation: 90  ER(side): 40  ER(abd): 80  IR(abd): 60  IR(vert): to waist.  Abduction: 100.  Shoulder strength: Elevation: 2/5  ER: 2/5  IR: 5-/5  ABD: 4/5.  Neer test is positive.  Gibson test is positive.  Arc of motion is positive.  Empty can test was positive.  Pulses are normal.  Normal sensation.  Capillary refill is normal.        Imaging/Diagnostic Studies  X-rays of the Left Shoulder [AP;Axillary;Grashey] were ordered and reviewed today.    Three-view x-ray left shoulder shows no fractures.  There is no elevation of the humeral head relative to the glenoid.  There is an area of avascular necrosis in the humeral head superior medially.    FINDINGS: There is serpiginous, geographic, curvilinear sclerosis in the  humeral head, typical for old  osteonecrosis. No cortical collapse is  present. There is some degenerative change at the shoulder and a  suspected chronic, large full-thickness supraspinatus tendon tear. The  supraspinatus muscle is deconditioned. The subscapularis, infraspinatus,  and the teres minor muscles are preserved. There is degenerative change  at the acromioclavicular joint with osseous undersurface prominence.  The glenoid cortex appears normal. There is no subcortical cystic change  or demineralization. Glenoid version is normal.  No periarticular cyst or mass is present. The visualized portion of the  left lung is clear.  Evidence of a chronic, large, complete supraspinatus tendon  tear. There is also old osteonecrosis at the humeral head. No  subcortical cystic change or bone loss is present at the glenoid.    Impression  Left chronic full thickness rotator cuff tear  Left shoulder avascular necrosis    Plan  He would like to have definitive care for the left shoulder.  He has had knee replacement on both knees.  At this far out from his injury repair the tendon is feasible based on its size and retraction.  I recommend left reverse total shoulder arthroplasty.  We discussed the procedure in detail along with potential benefits risks and complications.    We discussed the benefits of surgical intervention, as well as alternative treatments.  Potential surgical risks and complications include but are not limited to DVT, infection, neurovascular injury, fracture, implant wear, failure, possible need for revision surgery, loss of motion, dislocation, limb length changes.  Sufficient opportunity was given to discuss the condition and treatment plan with the doctor, and all questions were answered for the patient.  Nonsurgical measures such as injections, medications, or physical therapy may not offer significant relief to this patient.  LISSETT agreed to proceed with the surgery.  He like to schedule in December.

## 2021-12-16 ENCOUNTER — ANESTHESIA (OUTPATIENT)
Dept: PERIOP | Facility: HOSPITAL | Age: 66
End: 2021-12-16

## 2021-12-16 ENCOUNTER — ANESTHESIA EVENT (OUTPATIENT)
Dept: PERIOP | Facility: HOSPITAL | Age: 66
End: 2021-12-16

## 2021-12-16 ENCOUNTER — APPOINTMENT (OUTPATIENT)
Dept: GENERAL RADIOLOGY | Facility: HOSPITAL | Age: 66
End: 2021-12-16

## 2021-12-16 ENCOUNTER — HOSPITAL ENCOUNTER (OUTPATIENT)
Facility: HOSPITAL | Age: 66
Setting detail: HOSPITAL OUTPATIENT SURGERY
Discharge: HOME | End: 2021-12-16
Attending: ORTHOPAEDIC SURGERY | Admitting: ORTHOPAEDIC SURGERY

## 2021-12-16 VITALS
OXYGEN SATURATION: 93 % | SYSTOLIC BLOOD PRESSURE: 120 MMHG | DIASTOLIC BLOOD PRESSURE: 66 MMHG | RESPIRATION RATE: 18 BRPM | TEMPERATURE: 97.6 F | HEART RATE: 67 BPM

## 2021-12-16 DIAGNOSIS — Z96.612 STATUS POST REVERSE TOTAL ARTHROPLASTY OF LEFT SHOULDER: Primary | ICD-10-CM

## 2021-12-16 PROCEDURE — 25010000002 ONDANSETRON PER 1 MG: Performed by: NURSE ANESTHETIST, CERTIFIED REGISTERED

## 2021-12-16 PROCEDURE — 25010000002 SUCCINYLCHOLINE PER 20 MG: Performed by: NURSE ANESTHETIST, CERTIFIED REGISTERED

## 2021-12-16 PROCEDURE — 25010000002 PROPOFOL 10 MG/ML EMULSION: Performed by: NURSE ANESTHETIST, CERTIFIED REGISTERED

## 2021-12-16 PROCEDURE — C1776 JOINT DEVICE (IMPLANTABLE): HCPCS | Performed by: ORTHOPAEDIC SURGERY

## 2021-12-16 PROCEDURE — 97165 OT EVAL LOW COMPLEX 30 MIN: CPT

## 2021-12-16 PROCEDURE — 0 CEFAZOLIN PER 500 MG: Performed by: ORTHOPAEDIC SURGERY

## 2021-12-16 PROCEDURE — 25010000002 FENTANYL CITRATE (PF) 50 MCG/ML SOLUTION: Performed by: ANESTHESIOLOGY

## 2021-12-16 PROCEDURE — 76942 ECHO GUIDE FOR BIOPSY: CPT | Performed by: ORTHOPAEDIC SURGERY

## 2021-12-16 PROCEDURE — 97535 SELF CARE MNGMENT TRAINING: CPT

## 2021-12-16 PROCEDURE — 25010000002 DEXAMETHASONE PER 1 MG: Performed by: NURSE ANESTHETIST, CERTIFIED REGISTERED

## 2021-12-16 PROCEDURE — 25010000002 MIDAZOLAM PER 1 MG: Performed by: ANESTHESIOLOGY

## 2021-12-16 PROCEDURE — 0 BUPIVACAINE LIPOSOME 1.3 % SUSPENSION: Performed by: ANESTHESIOLOGY

## 2021-12-16 PROCEDURE — C9290 INJ, BUPIVACAINE LIPOSOME: HCPCS | Performed by: ANESTHESIOLOGY

## 2021-12-16 PROCEDURE — 25010000002 NEOSTIGMINE 10 MG/10ML SOLUTION: Performed by: NURSE ANESTHETIST, CERTIFIED REGISTERED

## 2021-12-16 PROCEDURE — 0 CEFAZOLIN IN DEXTROSE 2-4 GM/100ML-% SOLUTION: Performed by: ORTHOPAEDIC SURGERY

## 2021-12-16 PROCEDURE — 73020 X-RAY EXAM OF SHOULDER: CPT

## 2021-12-16 DEVICE — IMPLANTABLE DEVICE
Type: IMPLANTABLE DEVICE | Site: SHOULDER | Status: FUNCTIONAL
Brand: EQUINOXE

## 2021-12-16 DEVICE — IMPLANTABLE DEVICE: Type: IMPLANTABLE DEVICE | Site: SHOULDER | Status: FUNCTIONAL

## 2021-12-16 RX ORDER — SODIUM CHLORIDE 0.9 % (FLUSH) 0.9 %
3 SYRINGE (ML) INJECTION EVERY 12 HOURS SCHEDULED
Status: DISCONTINUED | OUTPATIENT
Start: 2021-12-16 | End: 2021-12-16 | Stop reason: HOSPADM

## 2021-12-16 RX ORDER — BUPIVACAINE HYDROCHLORIDE 5 MG/ML
INJECTION, SOLUTION EPIDURAL; INTRACAUDAL
Status: COMPLETED | OUTPATIENT
Start: 2021-12-16 | End: 2021-12-16

## 2021-12-16 RX ORDER — OXYCODONE AND ACETAMINOPHEN 7.5; 325 MG/1; MG/1
1 TABLET ORAL EVERY 4 HOURS PRN
Qty: 30 TABLET | Refills: 0 | Status: SHIPPED | OUTPATIENT
Start: 2021-12-16 | End: 2022-12-12

## 2021-12-16 RX ORDER — MIDAZOLAM HYDROCHLORIDE 1 MG/ML
2 INJECTION INTRAMUSCULAR; INTRAVENOUS ONCE
Status: COMPLETED | OUTPATIENT
Start: 2021-12-16 | End: 2021-12-16

## 2021-12-16 RX ORDER — MAGNESIUM HYDROXIDE 1200 MG/15ML
LIQUID ORAL AS NEEDED
Status: DISCONTINUED | OUTPATIENT
Start: 2021-12-16 | End: 2021-12-16 | Stop reason: HOSPADM

## 2021-12-16 RX ORDER — SUCCINYLCHOLINE CHLORIDE 20 MG/ML
INJECTION INTRAMUSCULAR; INTRAVENOUS AS NEEDED
Status: DISCONTINUED | OUTPATIENT
Start: 2021-12-16 | End: 2021-12-16 | Stop reason: SURG

## 2021-12-16 RX ORDER — ONDANSETRON 2 MG/ML
4 INJECTION INTRAMUSCULAR; INTRAVENOUS ONCE AS NEEDED
Status: DISCONTINUED | OUTPATIENT
Start: 2021-12-16 | End: 2021-12-16 | Stop reason: HOSPADM

## 2021-12-16 RX ORDER — BUPIVACAINE HYDROCHLORIDE 2.5 MG/ML
INJECTION, SOLUTION EPIDURAL; INFILTRATION; INTRACAUDAL
Status: COMPLETED | OUTPATIENT
Start: 2021-12-16 | End: 2021-12-16

## 2021-12-16 RX ORDER — FENTANYL CITRATE 50 UG/ML
50 INJECTION, SOLUTION INTRAMUSCULAR; INTRAVENOUS ONCE
Status: COMPLETED | OUTPATIENT
Start: 2021-12-16 | End: 2021-12-16

## 2021-12-16 RX ORDER — MIDAZOLAM HYDROCHLORIDE 1 MG/ML
1 INJECTION INTRAMUSCULAR; INTRAVENOUS
Status: DISCONTINUED | OUTPATIENT
Start: 2021-12-16 | End: 2021-12-16 | Stop reason: HOSPADM

## 2021-12-16 RX ORDER — MIDAZOLAM HYDROCHLORIDE 1 MG/ML
0.5 INJECTION INTRAMUSCULAR; INTRAVENOUS
Status: DISCONTINUED | OUTPATIENT
Start: 2021-12-16 | End: 2021-12-16 | Stop reason: HOSPADM

## 2021-12-16 RX ORDER — LIDOCAINE HYDROCHLORIDE 20 MG/ML
INJECTION, SOLUTION INFILTRATION; PERINEURAL AS NEEDED
Status: DISCONTINUED | OUTPATIENT
Start: 2021-12-16 | End: 2021-12-16 | Stop reason: SURG

## 2021-12-16 RX ORDER — EPHEDRINE SULFATE 50 MG/ML
5 INJECTION, SOLUTION INTRAVENOUS ONCE AS NEEDED
Status: DISCONTINUED | OUTPATIENT
Start: 2021-12-16 | End: 2021-12-16 | Stop reason: HOSPADM

## 2021-12-16 RX ORDER — CEFAZOLIN SODIUM 2 G/100ML
2 INJECTION, SOLUTION INTRAVENOUS ONCE
Status: COMPLETED | OUTPATIENT
Start: 2021-12-16 | End: 2021-12-16

## 2021-12-16 RX ORDER — FENTANYL CITRATE 50 UG/ML
50 INJECTION, SOLUTION INTRAMUSCULAR; INTRAVENOUS
Status: DISCONTINUED | OUTPATIENT
Start: 2021-12-16 | End: 2021-12-16 | Stop reason: HOSPADM

## 2021-12-16 RX ORDER — GLYCOPYRROLATE 0.2 MG/ML
INJECTION INTRAMUSCULAR; INTRAVENOUS AS NEEDED
Status: DISCONTINUED | OUTPATIENT
Start: 2021-12-16 | End: 2021-12-16 | Stop reason: SURG

## 2021-12-16 RX ORDER — ONDANSETRON 4 MG/1
4 TABLET, FILM COATED ORAL EVERY 8 HOURS PRN
Qty: 20 TABLET | Refills: 0 | Status: SHIPPED | OUTPATIENT
Start: 2021-12-16 | End: 2022-12-12

## 2021-12-16 RX ORDER — FLUMAZENIL 0.1 MG/ML
0.2 INJECTION INTRAVENOUS AS NEEDED
Status: DISCONTINUED | OUTPATIENT
Start: 2021-12-16 | End: 2021-12-16 | Stop reason: HOSPADM

## 2021-12-16 RX ORDER — GLYCOPYRROLATE 0.2 MG/ML
0.2 INJECTION INTRAMUSCULAR; INTRAVENOUS
Status: COMPLETED | OUTPATIENT
Start: 2021-12-16 | End: 2021-12-16

## 2021-12-16 RX ORDER — SODIUM CHLORIDE 0.9 % (FLUSH) 0.9 %
3-10 SYRINGE (ML) INJECTION AS NEEDED
Status: DISCONTINUED | OUTPATIENT
Start: 2021-12-16 | End: 2021-12-16 | Stop reason: HOSPADM

## 2021-12-16 RX ORDER — PROPOFOL 10 MG/ML
VIAL (ML) INTRAVENOUS AS NEEDED
Status: DISCONTINUED | OUTPATIENT
Start: 2021-12-16 | End: 2021-12-16 | Stop reason: SURG

## 2021-12-16 RX ORDER — SODIUM CHLORIDE, SODIUM LACTATE, POTASSIUM CHLORIDE, CALCIUM CHLORIDE 600; 310; 30; 20 MG/100ML; MG/100ML; MG/100ML; MG/100ML
9 INJECTION, SOLUTION INTRAVENOUS CONTINUOUS
Status: DISCONTINUED | OUTPATIENT
Start: 2021-12-16 | End: 2021-12-16 | Stop reason: HOSPADM

## 2021-12-16 RX ORDER — DEXAMETHASONE SODIUM PHOSPHATE 10 MG/ML
INJECTION INTRAMUSCULAR; INTRAVENOUS AS NEEDED
Status: DISCONTINUED | OUTPATIENT
Start: 2021-12-16 | End: 2021-12-16 | Stop reason: SURG

## 2021-12-16 RX ORDER — ONDANSETRON 2 MG/ML
INJECTION INTRAMUSCULAR; INTRAVENOUS AS NEEDED
Status: DISCONTINUED | OUTPATIENT
Start: 2021-12-16 | End: 2021-12-16 | Stop reason: SURG

## 2021-12-16 RX ORDER — LIDOCAINE HYDROCHLORIDE 10 MG/ML
0.5 INJECTION, SOLUTION EPIDURAL; INFILTRATION; INTRACAUDAL; PERINEURAL ONCE AS NEEDED
Status: DISCONTINUED | OUTPATIENT
Start: 2021-12-16 | End: 2021-12-16 | Stop reason: HOSPADM

## 2021-12-16 RX ORDER — NEOSTIGMINE METHYLSULFATE 1 MG/ML
INJECTION, SOLUTION INTRAVENOUS AS NEEDED
Status: DISCONTINUED | OUTPATIENT
Start: 2021-12-16 | End: 2021-12-16 | Stop reason: SURG

## 2021-12-16 RX ORDER — ROCURONIUM BROMIDE 10 MG/ML
INJECTION, SOLUTION INTRAVENOUS AS NEEDED
Status: DISCONTINUED | OUTPATIENT
Start: 2021-12-16 | End: 2021-12-16 | Stop reason: SURG

## 2021-12-16 RX ORDER — FENTANYL CITRATE 50 UG/ML
100 INJECTION, SOLUTION INTRAMUSCULAR; INTRAVENOUS
Status: DISCONTINUED | OUTPATIENT
Start: 2021-12-16 | End: 2021-12-16 | Stop reason: HOSPADM

## 2021-12-16 RX ADMIN — PROPOFOL 200 MG: 10 INJECTION, EMULSION INTRAVENOUS at 07:30

## 2021-12-16 RX ADMIN — FENTANYL CITRATE 50 MCG: 50 INJECTION INTRAMUSCULAR; INTRAVENOUS at 06:22

## 2021-12-16 RX ADMIN — BUPIVACAINE 10 ML: 13.3 INJECTION, SUSPENSION, LIPOSOMAL INFILTRATION at 06:33

## 2021-12-16 RX ADMIN — LIDOCAINE HYDROCHLORIDE 80 MG: 20 INJECTION, SOLUTION INFILTRATION; PERINEURAL at 07:30

## 2021-12-16 RX ADMIN — BUPIVACAINE HYDROCHLORIDE 10 ML: 2.5 INJECTION, SOLUTION EPIDURAL; INFILTRATION; INTRACAUDAL; PERINEURAL at 06:33

## 2021-12-16 RX ADMIN — ROCURONIUM BROMIDE 20 MG: 50 INJECTION INTRAVENOUS at 08:07

## 2021-12-16 RX ADMIN — ROCURONIUM BROMIDE 10 MG: 50 INJECTION INTRAVENOUS at 07:30

## 2021-12-16 RX ADMIN — GLYCOPYRROLATE 0.4 MG: 0.2 INJECTION INTRAMUSCULAR; INTRAVENOUS at 09:01

## 2021-12-16 RX ADMIN — SUCCINYLCHOLINE CHLORIDE 120 MG: 20 INJECTION, SOLUTION INTRAMUSCULAR; INTRAVENOUS; PARENTERAL at 07:30

## 2021-12-16 RX ADMIN — DEXAMETHASONE SODIUM PHOSPHATE 8 MG: 10 INJECTION INTRAMUSCULAR; INTRAVENOUS at 07:39

## 2021-12-16 RX ADMIN — NEOSTIGMINE METHYLSULFATE 4 MG: 1 INJECTION INTRAVENOUS at 09:01

## 2021-12-16 RX ADMIN — ROCURONIUM BROMIDE 40 MG: 50 INJECTION INTRAVENOUS at 07:39

## 2021-12-16 RX ADMIN — MIDAZOLAM 2 MG: 1 INJECTION INTRAMUSCULAR; INTRAVENOUS at 06:22

## 2021-12-16 RX ADMIN — ONDANSETRON 4 MG: 2 INJECTION INTRAMUSCULAR; INTRAVENOUS at 08:46

## 2021-12-16 RX ADMIN — SODIUM CHLORIDE, POTASSIUM CHLORIDE, SODIUM LACTATE AND CALCIUM CHLORIDE 9 ML/HR: 600; 310; 30; 20 INJECTION, SOLUTION INTRAVENOUS at 06:00

## 2021-12-16 RX ADMIN — CEFAZOLIN SODIUM 2 G: 2 INJECTION, SOLUTION INTRAVENOUS at 07:33

## 2021-12-16 RX ADMIN — BUPIVACAINE HYDROCHLORIDE 10 ML: 5 INJECTION, SOLUTION EPIDURAL; INTRACAUDAL; PERINEURAL at 06:33

## 2021-12-16 RX ADMIN — GLYCOPYRROLATE 0.2 MG: 0.2 INJECTION INTRAMUSCULAR; INTRAVENOUS at 06:18

## 2021-12-16 NOTE — OP NOTE
Date: 12/16/2021  Time: 09:05 EST TOTAL SHOULDER REVERSE ARTHROPLASTY  Procedure Note    Chris Brown Jr.  12/16/2021    Pre-op Diagnosis:   Left shoulder rotator cuff arthropathy    Post-op Diagnosis  Left shoulder rotator cuff arthropathy    Procedure:  Left reverse total shoulder arthroplasty with intraoperative computer navigation    Surgeon: Shankar Nunez M.D.    Surgical assistant: Gloria Sanchez CSA      Anesthesia: General with Block  Anesthesiologist: Justus Nam MD  CRNA: Shelley Healy CRNA    Staff:   Circulator: Erin Carrizales RN; Liliana Patel RN  Scrub Person: Gricelda Nick  Assistant: Gloria Sanchez    Indication for Asst.  A surgical assistant, Gloria Sanchez CSA , was utilized as a medical necessity and was present through the entire procedure allowing safe completion of the procedure as well as reducing overall operative time and morbidity for the patient.    IV antibiotic: Cefazolin    Estimated Blood Loss: 200 ml    Specimens: * No orders in the log *    Complications: None    Implants: [employer] reverse total shoulder system     Implant specifics: 9 mm preserve press-fit stem, +0 humeral tray, +0 humeral liner 42 mm, standard baseplate with 4 compression screws and locking caps, 42 mm glenosphere.      Procedure: The patient was taken to the operative suite.  After adequate anesthesia was established the upper extremity was prepped and draped in the usual fashion.  The head was placed in a neutral position and bony prominences were padded.  Ioban was utilized covering the skin over the shoulder and axilla.  An anterior incision was made starting lateral to the coracoid towards the axillary crease through skin and subcutaneous tissues.  The deltopectoral interval was entered.  The cephalic vein was preserved.  The conjoined tendon was reflected medially.  The biceps tendon was identified and tenodesed to the pectoralis.  The tendon was then tenotomized  more proximally.  The subscapularis was divided and tagged.  Arthropathic changes were noted in the glenohumeral joint.  The rotator cuff was in poor condition with tearing noted.  The capsule was released off the humeral neck and the posterior soft tissue attachments were protected. An external cutting guide was utilized in the head was cut at a 20° retroverted angle.  Excess osteophytes were excised. Appropriate impaction broaching was carried out.  The final broach was left in place for later trialing.   I then visualized the glenoid and retractors were placed.  The capsule was reflected off the deep surface of the subscapularis.  The capsule was also released off the humeral neck and off the inferior glenoid protecting the axillary nerve and transversing blood vessels throughout.  Soft tissues were dissected off the superior glenoid including the biceps stump.  This allowed for visualization of the glenoid.  The coracoid was skeletonized with the Bovie exposing the anterior portion and the neck of the coracoid.  A tracker was fixed to the coracoid with 2 small bicortical screws with firm fixation.  Acquisitions were then obtained from the bony surface of the coracoid and glenoid to transfer data to the computer to allow for navigation.  Once this was complete I was able to use computer guidance for placement of the glenoid.  Using navigation a central drill point was drilled.  Sequential reaming starting with the smallest reamer was carried out to the appropriate depth and size set by our preoperative plan.  A central hole was then drilled for the cage using computer navigation.  This assured that we were within the vault of the glenoid and preserved as much bone as possible while placing the baseplate in a stable position.    Autogenous bone graft was harvested from the humeral head and packed into the cage of the baseplate.  The baseplate was then compressed onto the glenoid.  Computer navigation was then used  to drill holes for the compression screws.  These were then placed to the appropriate depth and locking caps were placed over the screw heads.  Stable fixation was noted.  An appropriate size glenosphere was chosen and fixed to the baseplate with a central compression screw.  I then removed the tracker from the coracoid by removing both bicortical screws.  I then trialed the humeral tray and polyethylene.  Once satisfied with range of motion and stability I remove the humeral components.  I pulse lavaged the proximal humerus and press-fit the appropriate size stem with good purchase and fixation.  Next I re-trialed the humeral tray and humeral liner.  When I was satisfied with range of motion and stability I removed the trial liner and tray.  The appropriate humeral tray was in place and held with a torque limiting screw.  The polyethylene was then inserted and compressed into place and the shoulder was reduced.  Subdeltoid scar tissue was excised area  Good range of motion was noted.  Good stability was noted.  Vigorous irrigation and suctioning was performed. The deltopectoral interval was closed with 0 Vicryl.  The subcutaneous tissues were closed with 2-0 Vicryl.  The skin was closed with a zip line device.  The patient had a sterile compression dressing applied and was awoken and taken to the postanesthetic recovery unit in good condition.    Shankar Nunez MD     Date: 12/16/2021  Time: 09:05 EST

## 2021-12-16 NOTE — ANESTHESIA PREPROCEDURE EVALUATION
Anesthesia Evaluation     Patient summary reviewed and Nursing notes reviewed   no history of anesthetic complications:  NPO Solid Status: > 8 hours             Airway   Mallampati: III  TM distance: <3 FB  Neck ROM: limited  possible difficult intubation  Dental - normal exam     Pulmonary - negative pulmonary ROS and normal exam    breath sounds clear to auscultation  (-) rhonchi, decreased breath sounds, wheezes, rales, stridor  Cardiovascular - normal exam    ECG reviewed  Patient on routine beta blocker and Beta blocker given within 24 hours of surgery  Rhythm: regular  Rate: normal    (+) hypertension well controlled,   (-) murmur, weak pulses, friction rub, systolic click, carotid bruits, JVD, peripheral edema      Neuro/Psych- negative ROS  GI/Hepatic/Renal/Endo    (+)  GERD,      Musculoskeletal (-) negative ROS    Abdominal  - normal exam    Abdomen: soft.   Substance History - negative use     OB/GYN negative ob/gyn ROS         Other - negative ROS       ROS/Med Hx Other: Hx cervical frx w/ ecreased rom                    Anesthesia Plan    ASA 3     general with block     intravenous induction     Anesthetic plan, all risks, benefits, and alternatives have been provided, discussed and informed consent has been obtained with: patient.

## 2021-12-16 NOTE — THERAPY EVALUATION
Patient Name: Chris Brown Jr.  : 1955    MRN: [id number]                              Today's Date: 2021       Admit Date: 2021    Visit Dx:     ICD-10-CM ICD-9-CM   1. Status post reverse total arthroplasty of left shoulder  Z96.612 V43.61     Patient Active Problem List   Diagnosis   • Primary osteoarthritis of knee   • HTN (hypertension)     Past Medical History:   Diagnosis Date   • Acid reflux    • Cervicalgia     SEVERE PAIN WITH EXTENSION   • Decreased ROM of neck     NEEDS HOB ELEVATED 45 DEGREES   • MORALES (dyspnea on exertion)    • H/O cervical fracture 2016    MULTIPLE LEVELS WITH SEVER LIMITED RANGE OF MOTION   • Hypertension    • Left shoulder pain    • Migraines    • Prediabetes    • Traumatic complete tear of left rotator cuff 2016     Past Surgical History:   Procedure Laterality Date   • HERNIA REPAIR     • WI TOTAL KNEE ARTHROPLASTY Right 2017    Procedure: RT TOTAL KNEE ARTHROPLASTY;  Surgeon: Tomas Scott MD;  Location: [facility];  Service: Orthopedics   • SHOULDER ARTHROSCOPY Right    • TOTAL KNEE ARTHROPLASTY Left       General Information     Row Name 21 1233          OT Time and Intention    Document Type discharge evaluation/summary  -     Mode of Treatment occupational therapy; individual therapy  -     Row Name 21 1233          General Information    Patient Profile Reviewed yes  -SM     Prior Level of Function independent:; ADL's; all household mobility  -     Existing Precautions/Restrictions fall; non-weight bearing; left; shoulder  -     Row Name 21 1233          Living Environment    Lives With spouse  -     Row Name 21 1233          Cognition    Orientation Status (Cognition) oriented x 4  -SM     Row Name 21 1233          Safety Issues, Functional Mobility    Impairments Affecting Function (Mobility) strength; range of motion (ROM); sensation/sensory awareness  -           User Key  (r) =  Recorded By, (t) = Taken By, (c) = Cosigned By    Initials Name Provider Type     Jossie Maria OT Occupational Therapist                 Mobility/ADL's     Row Name 12/16/21 1238          Bed Mobility    Bed Mobility supine-sit  -     Supine-Sit [place] (Bed Mobility) standby assist  -     Row Name 12/16/21 1238          Transfers    Comment (Transfers) Pt sitting EOB and blood draining from incision site, RN called in and calling MD. Pt returned to bed.  -     Row Name 12/16/21 1238          Activities of Daily Living    BADL Assessment/Intervention upper body dressing  -     Row Name 12/16/21 1238          Mobility    Extremity Weight-bearing Status left upper extremity  -     Left Upper Extremity (Weight-bearing Status) non weight-bearing (NWB)  -     Row Name 12/16/21 1238          Upper Body Dressing Assessment/Training    Comment (Upper Body Dressing) OT educated pt in shoulder precautions for dressing and pt and spouse verbalize understanding. Pt was about to get dressed as surgical incision bleeding and returned to bed for MD to look at.  -           User Key  (r) = Recorded By, (t) = Taken By, (c) = Cosigned By    Initials Name Provider Type     Jossie Maria OT Occupational Therapist               Obj/Interventions     Row Name 12/16/21 1241          Sensory Interventions    Comment, Sensory Intervention Surgical block intact POD 1  -     Row Name 12/16/21 1241          Range of Motion Comprehensive    Comment, General Range of Motion LUE some active movement in digits- surgical block still intact. RUE AROM WFL  -     Row Name 12/16/21 1241          Strength Comprehensive (MMT)    Comment, General Manual Muscle Testing (MMT) Assessment LUT-NT, RUE functional strength noted > or = 3/5  -     Row Name 12/16/21 1241          Therapeutic Exercise    Therapeutic Exercise --  OT educated pt and spouse in HEP to complete POD 1 once block has worn off. Pendulums, elbow  flex/extension, forearm pronation/supination, wrist flex/extension, digit flex/extension  -           User Key  (r) = Recorded By, (t) = Taken By, (c) = Cosigned By    Initials Name Provider Type    Jossie Billingsley OT Occupational Therapist               Goals/Plan    No documentation.                Clinical Impression     Row Name 12/16/21 1243          Pain Assessment    Additional Documentation Pain Scale: Numbers Pre/Post-Treatment (Group)  -[facility] Name 12/16/21 1243          Pain Scale: Numbers Pre/Post-Treatment    Pretreatment Pain Rating 0/10 - no pain  -[facility] Name 12/16/21 1243          Plan of Care Review    Plan of Care Reviewed With patient  -     Outcome Summary Pt is a 66 y.o male POD 0 from L reverse TSA, plans to dc home today. OT saw pt in recovery to go over HEP to begin tomorrow and shoulder precautions for ADL. Pt verbalized understanding of all information and has spouse to assist him at dc as needed. As OT sat pt up on EOB to get up to chair. Pt has bleeding coming from surgical site and RN notifed. She contacted MD who redressed incision and now plans to dc home. Pt and spouse report no further questions.  -     Row Name 12/16/21 1243          Therapy Assessment/Plan (OT)    Criteria for Skilled Therapeutic Interventions Met (OT) yes; skilled treatment is necessary  -     Therapy Frequency (OT) evaluation only  -     Row Name 12/16/21 1243          Therapy Plan Review/Discharge Plan (OT)    Anticipated Discharge Disposition (OT) home with outpatient therapy services  -[facility] Name 12/16/21 1243          Positioning and Restraints    Pre-Treatment Position in bed  -     Post Treatment Position bed  -SM     In Bed with nsg; with family/caregiver  -           User Key  (r) = Recorded By, (t) = Taken By, (c) = Cosigned By    Initials Name Provider Type    Jossie Billingsley OT Occupational Therapist               Outcome Measures     Row Name 12/16/21 1242           How much help from another is currently needed...    Putting on and taking off regular lower body clothing? 3  -SM     Bathing (including washing, rinsing, and drying) 3  -SM     Toileting (which includes using toilet bed pan or urinal) 3  -SM     Putting on and taking off regular upper body clothing 3  -SM     Taking care of personal grooming (such as brushing teeth) 3  -SM     Eating meals 3  -SM     AM-PAC 6 Clicks Score (OT) 18  -SM     Row Name 12/16/21 1246          Functional Assessment    Outcome Measure Options AM-PAC 6 Clicks Daily Activity (OT)  -           User Key  (r) = Recorded By, (t) = Taken By, (c) = Cosigned By    Initials Name Provider Type    Jossie Billingsley OT Occupational Therapist                Occupational Therapy Education                 Title: PT OT SLP Therapies (In Progress)     Topic: Occupational Therapy (In Progress)     Point: ADL training (Done)     Description:   Instruct learner(s) on proper safety adaptation and remediation techniques during self care or transfers.   Instruct in proper use of assistive devices.              Learning Progress Summary           Patient Acceptance, E, VU by  at 12/16/2021 1246    Comment: Pt and spouse educated in shoulder precautions, HEP, positioning of sling, krzysztof dressing technique                   Point: Home exercise program (Done)     Description:   Instruct learner(s) on appropriate technique for monitoring, assisting and/or progressing therapeutic exercises/activities.              Learning Progress Summary           Patient Acceptance, E, VU by  at 12/16/2021 1246    Comment: Pt and spouse educated in shoulder precautions, HEP, positioning of sling, krzysztof dressing technique                   Point: Precautions (Done)     Description:   Instruct learner(s) on prescribed precautions during self-care and functional transfers.              Learning Progress Summary           Patient Acceptance, E, VU by  at 12/16/2021 1246     Comment: Pt and spouse educated in shoulder precautions, HEP, positioning of sling, krzysztof dressing technique                   Point: Body mechanics (Not Started)     Description:   Instruct learner(s) on proper positioning and spine alignment during self-care, functional mobility activities and/or exercises.              Learner Progress:  Not documented in this visit.                      User Key     Initials Effective Dates Name Provider Type Discipline     04/02/20 -  Jossie Maria OT Occupational Therapist OT              OT Recommendation and Plan  Therapy Frequency (OT): evaluation only  Plan of Care Review  Plan of Care Reviewed With: patient  Outcome Summary: Pt is a 66 y.o male POD 0 from L reverse TSA, plans to dc home today. OT saw pt in recovery to go over HEP to begin tomorrow and shoulder precautions for ADL. Pt verbalized understanding of all information and has spouse to assist him at dc as needed. As OT sat pt up on EOB to get up to chair. Pt has bleeding coming from surgical site and RN notifed. She contacted MD who redressed incision and now plans to dc home. Pt and spouse report no further questions.     Time Calculation:    Time Calculation- OT     Row Name 12/16/21 1248             Time Calculation- OT    OT Start Time 1104  -SM      OT Stop Time 1120  -SM      OT Time Calculation (min) 16 min  -SM      Total Timed Code Minutes- OT 8 minute(s)  -SM      OT Received On 12/16/21  -              Timed Charges    88777 - OT Self Care/Mgmt Minutes 8  -SM              Untimed Charges    OT Eval/Re-eval Minutes 8  -SM              Total Minutes    Timed Charges Total Minutes 8  -SM      Untimed Charges Total Minutes 8  -SM       Total Minutes 16  -SM            User Key  (r) = Recorded By, (t) = Taken By, (c) = Cosigned By    Initials Name Provider Type     Jossie Maria OT Occupational Therapist              Therapy Charges for Today     Code Description Service Date Service  Provider Modifiers Qty    01260107539 HC OT EVAL LOW COMPLEXITY 2 12/16/2021 Jossie Maria OT GO 1    14025602679 HC OT SELF CARE/MGMT/TRAIN EA 15 MIN 12/16/2021 Jossie Maria OT GO 1               Jossie Maria OT  12/16/2021

## 2021-12-16 NOTE — DISCHARGE INSTRUCTIONS
Dr. Nunez  [address] [facility] [address] [facility] [address]  [phone]    TOTAL & REVERSE TOTAL SHOULDER REPLACEMENT  HOSPITAL DISCHARGE INSTRUCTIONS     GENERAL INFORMATION: With improved surgical techniques for total shoulder and reverse total shoulder replacement and the Confucianist of ultrasound guided long acting nerve blocks, the hospital stay for patients has decreased significantly allowing patients to go home postoperatively.  SPECIFIC POST-OP INSTRUCTIONS:  * FOLLOW UP APPOINTMENT: You will need to call our office [phone] and make an appointment to follow up 10-14 days after your date of surgery. We can see you back sooner if there are any problems or concerns.   * BLOOD THINNERS: All patients will be on some type of blood thinner post-op to prevent blood clot. Most patients who are not already on a blood thinner are discharged on over-the-counter aspirin 325mg daily. If you were taking a blood thinner prior to surgery, we will have you resume this on the first day post-op.   * STAPLES: Incisional care is provided 10-14 days post-op in regards to removing staples or other devices to close the skin. This will be done during your first post-op appointment in our office.   * ICE: Ice helps to decrease both pain and swelling. Ice should be applied to the shoulder 20-25 minutes each hour while awake.   * DRESSING CHANGES: We ask that you keep the incision clean and dry. Please use water proof bandaids to cover incision while showering. These can be purchased at your local pharmacy. They may need to be overlapped to cover entire length of incision. These can be changed daily or as needed. Do not use any ointment on the incision.   * SHOWERING: The wound edges typically come together and seal by 3-5 days post-op if there is no drainage. Again, please use waterproof bandaids to cover incision while showering. DO NOT SUBMERSE SHOULDER IN POOL,HOT TUB OR BATH UNTIL AT LEAST 3-4 WEEKS POST-OP (EVEN WITH WATERPROOF BANDAIDS).  *  PAIN  MEDICINE: You will be given a prescription for oral pain medication prior to discharge from the hospital. Please let us know if you have a sensitivity to certain pain medications prior to discharge. Additional pain medication prescriptions can be written, but must be picked up at either our [place] or [place] office locations. They can NOT be called into your pharmacy.   * ORAL ANTI-INFLAMMATORIES:   You will be asked to discontinue ALL oral anti-inflammatories prior to surgery. You can resume these the first day post-op.These can be combined with the oral pain medications safely. DO NOT TAKE ADDITIONAL TYLENOL WITH THE NARCOTIC PAIN MEDICATION (it already has Tylenol in it). If you were not taking an anti-inflammatory pre-op, you can start one on the first day post-op. It will help decrease pain and swelling. Typical medications and dosages are as follows:   Advil/Motrin/Ibuprofen 200mg, 4 pills every 8 hours   Aleve/Naproxen Sodium 220mg, 2 pills every 12 hours  * SLING: We recommend you wear the sling at all times, other than when showering or doing physical therapy exercises.    * WEIGHT BEARING: We ask that you be non-weight bearing on the operative shoulder. Do not use the operative arm to lift/push/pull greater than 2lbs.   * PHYSICAL THERAPY: A physical therapist will see you in the hospital prior to your discharge.  They will teach you some very gentle range of motion exercises to do at home for the first 10-14 days. After we see you in the office during your first post-op visit, we will give you a prescription to start formal physical therapy. This can be done downstairs at [facility] or at a location of your choice. Physical therapy is typically 2-3 times a week for 4-6 weeks, depending on the individual and their progress.   * DRIVING A CAR: Medically/legally we can not recommend you drive a car while in the sling or while on pain medication (roughly 10-14 days).   * RETURNING TO DAILY AND  "RECREATIONAL ACTIVITIES: For the most part patients can progress to daily activities as tolerated (keeping in mind the restrictions listed above). Initially, we do not want you to \"overdo\" it in an attempt to minimize post-op pain and swelling. Once the swelling is controlled, you can progress with activities as tolerated. Pain and swelling should be your guide to increasing your activity level.   * RETURN TO WORK: The return to work date depends on many factors and is very dependent on the individual. You would most likely be able to return to a sedentary job after your first post-op visit (10-14 days after surgery). A more physical job would obviously require a longer recovery time before return to work.        What to expect after a Nerve Block    Nerve blocks administered to block pain affect many types of nerves, including those nerves that control movement, pain, and normal sensation. Following a nerve block, you may notice some bruising at the site where the block was given. You may experience sensations such as: numbness of the affected area or limb, tingling, heaviness (that is the limb feels heavy to you), weakness or inability to move the affected arm or leg, or a feeling as if your arm or leg has “fallen asleep.”     A nerve block can last from 2 to 36 hours depending on the medications used.  Usually the weakness wears off first followed by the tingling and heaviness. As the block wears off, you may begin to notice pain; however, this sequence of events may occur in any order. Typically, you will be able to move your limb before you will feel it. Once a nerve block begins to wear off, the effects are usually completely gone within 60 minutes.  If you experience continued side effects that you believe are block related for longer than 48 hours, please call your healthcare provider. Please see block-specific instructions below.    Instructions for any block involving the shoulder or arm  • If you have had " any kind of shoulder/arm block, you will go home with your arm in a sling. Wear the sling until the block has completely worn off. You may be required to wear it for a longer period of time per your surgeon’s recommendations.  • If you have had a shoulder/arm block, it is a good idea to sleep on a recliner with pillows under your arm.    You may experience symptoms such as:  Shortness of breath  Hoarseness   Blurry vision  Unequal pupils  Drooping of your face on the same side as the block was performed    These are side effects associated with this kind of block and should go away within 12 hours.    Note: If you have severe or prolonged shortness of breath, please seek medical assistance as soon as possible.     Protection of a “blocked” arm or leg (limb)  • After a nerve block, you cannot feel pain, pressure, or extremes of temperature in the affected limb. And because of this, your blocked limb is at more risk for injury. For example, it is possible to burn your limb on an extremely hot surface without feeling it.     • When resting, it is important to reposition your limb periodically to avoid prolonged pressure on it. This may require the use of pillows and padding.    • While sleeping, you should avoid rolling onto the affected limb or putting too much pressure on it.     • If you have a cast or tight dressing, check the color of your fingers or toes of the affected limb. Call your surgeon if they look discolored (that is, dusky, dark colored).    • Use caution in cold weather. Cover your limb appropriately to protect it from the cold.      Pain Management:    Your surgeon will give you a prescription for pain medication. Begin taking this before the nerve block wears off. Bear in mind that sometimes the block can wear off in the middle of the night.

## 2021-12-16 NOTE — ANESTHESIA PROCEDURE NOTES
Peripheral Block    Pre-sedation assessment completed: 12/16/2021 6:23 AM    Patient reassessed immediately prior to procedure    Patient location during procedure: pre-op  Start time: 12/16/2021 6:22 AM  Stop time: 12/16/2021 6:32 AM  Reason for block: at surgeon's request and post-op pain management  Performed by  Anesthesiologist: Justus Nam MD  Preanesthetic Checklist  Completed: patient identified, IV checked, site marked, risks and benefits discussed, surgical consent, monitors and equipment checked, pre-op evaluation and timeout performed  Prep:  Pt Position: supine  Sterile barriers:cap, gloves, mask and sterile barriers  Prep: ChloraPrep  Patient monitoring: blood pressure monitoring, continuous pulse oximetry and EKG  Procedure    Sedation: yes  Performed under: local infiltration  Guidance:ultrasound guided    ULTRASOUND INTERPRETATION.  Using ultrasound guidance a 22 G gauge needle was placed in close proximity to the brachial plexus nerve, at which point, under ultrasound guidance anesthetic was injected in the area of the nerve and spread of the anesthesia was seen on ultrasound in close proximity thereto.  There were no abnormalities seen on ultrasound; a digital image was taken; and the patient tolerated the procedure with no complications. Images:still images obtained    Laterality:left  Block Type:interscalene  Injection Technique:single-shot  Needle Type:echogenic  Needle Gauge:22 G  Resistance on Injection: less than 15 psi    Medications Used: bupivacaine liposome (EXPAREL) 1.3 % injection, 10 mL  bupivacaine PF (MARCAINE) 0.25 % injection, 10 mL  bupivacaine (PF) (MARCAINE) 0.5 % injection, 10 mL  Med administered at 12/16/2021 6:33 AM      Post Assessment  Injection Assessment: negative aspiration for heme, no paresthesia on injection and incremental injection  Patient Tolerance:comfortable throughout block  Complications:no

## 2021-12-16 NOTE — PERIOPERATIVE NURSING NOTE
When pt was starting to get dressed, sanguinous drainage was dripping down inside of arm. pressure held, dr Nunez notified in OR. Dr Nunez in , removed dressing , no active bleeding . Dr Nunez redressed incision

## 2021-12-16 NOTE — ANESTHESIA POSTPROCEDURE EVALUATION
Patient: Chris Brown Jr.    Procedure Summary     Date: 12/16/21 [facility]    Anesthesia Start: 0723 Anesthesia Stop: 0911    Procedure: REVERSE TOTAL SHOULDER ARTHROPLASTY (Left Shoulder) Diagnosis:     Surgeons: Shankar Nunez MD Provider: Justus Nam MD    Anesthesia Type: general with block ASA Status: 3          Anesthesia Type: general with block    Vitals  Vitals Value Taken Time   /76 12/16/21 1001   Temp 36.4 °C (97.6 °F) 12/16/21 1000   Pulse 64 12/16/21 1023   Resp 19 12/16/21 1000   SpO2 93 % 12/16/21 1023   Vitals shown include unvalidated device data.        Post Anesthesia Care and Evaluation    Patient location during evaluation: bedside  Patient participation: complete - patient participated  Level of consciousness: awake  Pain management: adequate  Airway patency: patent  Anesthetic complications: No anesthetic complications  PONV Status: controlled  Cardiovascular status: acceptable  Respiratory status: acceptable  Hydration status: acceptable    Comments: ---------------------------               12/16/21                      1000         ---------------------------   BP:          133/76         Pulse:         68           Resp:          19           Temp:   36.4 °C (97.6 °F)   SpO2:          92%         ---------------------------

## 2021-12-16 NOTE — PLAN OF CARE
Goal Outcome Evaluation:  Plan of Care Reviewed With: patient           Outcome Summary: Pt is a 66 y.o male POD 0 from L reverse TSA, plans to dc home today. OT saw pt in recovery to go over HEP to begin tomorrow and shoulder precautions for ADL. Pt verbalized understanding of all information and has spouse to assist him at dc as needed. As OT sat pt up on EOB to get up to chair. Pt has bleeding coming from surgical site and RN notifed. She contacted MD who redressed incision and now plans to dc home. Pt and spouse report no further questions.    Appropriate PPE worn during encounter including gloves, mask, and eye protection. Hand hygiene completed. Pt wore a mask.

## 2021-12-16 NOTE — ANESTHESIA PROCEDURE NOTES
Airway  Urgency: elective    Date/Time: 12/16/2021 7:32 AM  Difficult airway    General Information and Staff    Patient location during procedure: OR  CRNA: Shelley Healy CRNA    Indications and Patient Condition  Indications for airway management: airway protection    Preoxygenated: yes  Mask difficulty assessment: 2 - vent by mask + OA or adjuvant +/- NMBA    Final Airway Details  Final airway type: endotracheal airway      Successful airway: ETT  Cuffed: yes   Successful intubation technique: video laryngoscopy  Facilitating devices/methods: intubating stylet  Endotracheal tube insertion site: oral  Blade: CMAC  Blade size: D  ETT size (mm): 7.5  Placement verified by: chest auscultation and capnometry   Number of attempts at approach: 1  Assessment: lips, teeth, and gum same as pre-op and atraumatic intubation    Additional Comments  Glide scope advanced atraumatically and intubated without difficulty.  Lips and teeth remain unchanged as preop condition.

## 2021-12-20 ENCOUNTER — TELEPHONE (OUTPATIENT)
Dept: ORTHOPEDIC SURGERY | Facility: HOSPITAL | Age: 66
End: 2021-12-20

## 2021-12-20 NOTE — TELEPHONE ENCOUNTER
Post op day 4  Discharge Instructions:  Ask patient about his or her discharge instructions  ?  Patient confirmed understanding   ?  Further instruction needed   What, if any, recommendations, teaching, or interventions did you provide? Click or tap here to enter text.  Health status:  Pain controlled Yes   Pain is controlled with medication. The block has worn off, but doing ok.   Recommended interventions:  No  Incision/dressing status   ?  Clean without redness, drainage, odor  ?  Redness    ?  Drainage - color Click or tap here to enter text.  ?  Odor  NANCY - Green light blinking Choose an item.  Difficulties urination No  Last BM 12/19/2021 (if no BM by day 3-recommend OTC suppository or fleets enema)  Was having some trouble but finally had a small BM. Recommended taking a mild laxative while taking the pain medication.   Medications:  ?Medications reviewed with patient/family/caregiver  Patient taking medications as prescribed?   Yes  If not taking medications as prescribed, note specific medicine(s) and reason for each:  Click or tap here to enter text.  Hospital Follow Up Plan:  Follow up Appointment with Orthopedic surgeon:  ?Has f/u appointment                ?Scheduled f/u appointment  Home Care ordered at discharge?    No        Home Care started, or contact made?    No   If no, action taken: Total Shoulder.  DME obtained/used in home?         Yes   Other information:  Mr. Brown said he is doing great. He had some increased pain, because when he had a tingling sensation to his fingers he thought the block was gone. He started increasing exercises that OT gave him and trying to do more, then realized the block hadn’t wore off and had increased pain. It is tolerable now with the pain medication. He was able to take a shower Continues to do the exercises 6 times a day. Keeping his arm in the sling and iced. He is pleased with how well everything is progressing. He doesn’t have any questions for me at  this time.

## 2021-12-28 ENCOUNTER — TELEPHONE (OUTPATIENT)
Dept: ORTHOPEDIC SURGERY | Facility: HOSPITAL | Age: 66
End: 2021-12-28

## 2021-12-28 NOTE — TELEPHONE ENCOUNTER
Called to speak with Mr. Brown to see how he is doing as he is 2 weeks SP Left Reverse shoulder. He states he is doing well. He is at the MD office for his check up. He is off the pain medications. No issues with BM's. He doesn't have any questions for me at this time. Mr. Brown has my contact information should he need anything. He voiced understanding.

## 2022-12-15 ENCOUNTER — ANESTHESIA (OUTPATIENT)
Dept: SURGERY | Facility: SURGERY CENTER | Age: 67
End: 2022-12-15

## 2022-12-15 ENCOUNTER — ANESTHESIA EVENT (OUTPATIENT)
Dept: SURGERY | Facility: SURGERY CENTER | Age: 67
End: 2022-12-15

## 2022-12-15 ENCOUNTER — HOSPITAL ENCOUNTER (OUTPATIENT)
Facility: SURGERY CENTER | Age: 67
Setting detail: HOSPITAL OUTPATIENT SURGERY
Discharge: HOME | End: 2022-12-15
Attending: OTOLARYNGOLOGY | Admitting: OTOLARYNGOLOGY

## 2022-12-15 VITALS
HEART RATE: 62 BPM | SYSTOLIC BLOOD PRESSURE: 115 MMHG | HEIGHT: 70 IN | DIASTOLIC BLOOD PRESSURE: 72 MMHG | RESPIRATION RATE: 16 BRPM | BODY MASS INDEX: 33.21 KG/M2 | WEIGHT: 232 LBS | OXYGEN SATURATION: 93 % | TEMPERATURE: 98.1 F

## 2022-12-15 PROCEDURE — 25010000002 FENTANYL CITRATE (PF) 50 MCG/ML SOLUTION: Performed by: ANESTHESIOLOGY

## 2022-12-15 PROCEDURE — 25010000002 FENTANYL CITRATE (PF) 100 MCG/2ML SOLUTION: Performed by: ANESTHESIOLOGY

## 2022-12-15 PROCEDURE — 25010000002 EPINEPHRINE PER 0.1 MG: Performed by: OTOLARYNGOLOGY

## 2022-12-15 PROCEDURE — 25010000002 SUCCINYLCHOLINE PER 20 MG: Performed by: ANESTHESIOLOGY

## 2022-12-15 PROCEDURE — 25010000002 PROPOFOL 10 MG/ML EMULSION: Performed by: ANESTHESIOLOGY

## 2022-12-15 PROCEDURE — 25010000002 MIDAZOLAM PER 1MG: Performed by: ANESTHESIOLOGY

## 2022-12-15 RX ORDER — OMEPRAZOLE 10 MG/1
10 CAPSULE, DELAYED RELEASE ORAL DAILY
COMMUNITY

## 2022-12-15 RX ORDER — FLUMAZENIL 0.1 MG/ML
0.2 INJECTION INTRAVENOUS AS NEEDED
Status: DISCONTINUED | OUTPATIENT
Start: 2022-12-15 | End: 2022-12-15 | Stop reason: HOSPADM

## 2022-12-15 RX ORDER — SODIUM CHLORIDE 0.9 % (FLUSH) 0.9 %
10 SYRINGE (ML) INJECTION AS NEEDED
Status: DISCONTINUED | OUTPATIENT
Start: 2022-12-15 | End: 2022-12-15 | Stop reason: HOSPADM

## 2022-12-15 RX ORDER — MIDAZOLAM HYDROCHLORIDE 1 MG/ML
0.5 INJECTION INTRAMUSCULAR; INTRAVENOUS
Status: DISCONTINUED | OUTPATIENT
Start: 2022-12-15 | End: 2022-12-15 | Stop reason: HOSPADM

## 2022-12-15 RX ORDER — ONDANSETRON 2 MG/ML
4 INJECTION INTRAMUSCULAR; INTRAVENOUS ONCE AS NEEDED
Status: DISCONTINUED | OUTPATIENT
Start: 2022-12-15 | End: 2022-12-15 | Stop reason: HOSPADM

## 2022-12-15 RX ORDER — LIDOCAINE HYDROCHLORIDE 10 MG/ML
0.5 INJECTION, SOLUTION INFILTRATION; PERINEURAL ONCE AS NEEDED
Status: DISCONTINUED | OUTPATIENT
Start: 2022-12-15 | End: 2022-12-15 | Stop reason: HOSPADM

## 2022-12-15 RX ORDER — FENTANYL CITRATE 50 UG/ML
50 INJECTION, SOLUTION INTRAMUSCULAR; INTRAVENOUS
Status: DISCONTINUED | OUTPATIENT
Start: 2022-12-15 | End: 2022-12-15 | Stop reason: HOSPADM

## 2022-12-15 RX ORDER — MAGNESIUM HYDROXIDE 1200 MG/15ML
LIQUID ORAL AS NEEDED
Status: DISCONTINUED | OUTPATIENT
Start: 2022-12-15 | End: 2022-12-15 | Stop reason: HOSPADM

## 2022-12-15 RX ORDER — SODIUM CHLORIDE, SODIUM LACTATE, POTASSIUM CHLORIDE, CALCIUM CHLORIDE 600; 310; 30; 20 MG/100ML; MG/100ML; MG/100ML; MG/100ML
9 INJECTION, SOLUTION INTRAVENOUS CONTINUOUS
Status: DISCONTINUED | OUTPATIENT
Start: 2022-12-15 | End: 2022-12-15 | Stop reason: HOSPADM

## 2022-12-15 RX ORDER — EPINEPHRINE 1 MG/ML
INJECTION, SOLUTION, CONCENTRATE INTRAVENOUS AS NEEDED
Status: DISCONTINUED | OUTPATIENT
Start: 2022-12-15 | End: 2022-12-15 | Stop reason: HOSPADM

## 2022-12-15 RX ORDER — SUCCINYLCHOLINE CHLORIDE 20 MG/ML
INJECTION INTRAMUSCULAR; INTRAVENOUS AS NEEDED
Status: DISCONTINUED | OUTPATIENT
Start: 2022-12-15 | End: 2022-12-15 | Stop reason: SURG

## 2022-12-15 RX ORDER — FENTANYL CITRATE 50 UG/ML
INJECTION, SOLUTION INTRAMUSCULAR; INTRAVENOUS AS NEEDED
Status: DISCONTINUED | OUTPATIENT
Start: 2022-12-15 | End: 2022-12-15 | Stop reason: SURG

## 2022-12-15 RX ORDER — PROMETHAZINE HYDROCHLORIDE 12.5 MG/1
25 TABLET ORAL ONCE AS NEEDED
Status: DISCONTINUED | OUTPATIENT
Start: 2022-12-15 | End: 2022-12-15 | Stop reason: HOSPADM

## 2022-12-15 RX ORDER — DIPHENHYDRAMINE HCL 25 MG
25 CAPSULE ORAL
Status: DISCONTINUED | OUTPATIENT
Start: 2022-12-15 | End: 2022-12-15 | Stop reason: HOSPADM

## 2022-12-15 RX ORDER — PROMETHAZINE HYDROCHLORIDE 25 MG/1
25 SUPPOSITORY RECTAL ONCE AS NEEDED
Status: DISCONTINUED | OUTPATIENT
Start: 2022-12-15 | End: 2022-12-15 | Stop reason: HOSPADM

## 2022-12-15 RX ORDER — FAMOTIDINE 10 MG/ML
20 INJECTION, SOLUTION INTRAVENOUS ONCE
Status: COMPLETED | OUTPATIENT
Start: 2022-12-15 | End: 2022-12-15

## 2022-12-15 RX ORDER — SODIUM CHLORIDE, SODIUM LACTATE, POTASSIUM CHLORIDE, CALCIUM CHLORIDE 600; 310; 30; 20 MG/100ML; MG/100ML; MG/100ML; MG/100ML
1000 INJECTION, SOLUTION INTRAVENOUS CONTINUOUS
Status: DISCONTINUED | OUTPATIENT
Start: 2022-12-15 | End: 2022-12-15 | Stop reason: HOSPADM

## 2022-12-15 RX ORDER — PROPOFOL 10 MG/ML
VIAL (ML) INTRAVENOUS AS NEEDED
Status: DISCONTINUED | OUTPATIENT
Start: 2022-12-15 | End: 2022-12-15 | Stop reason: SURG

## 2022-12-15 RX ORDER — NALOXONE HCL 0.4 MG/ML
0.2 VIAL (ML) INJECTION AS NEEDED
Status: DISCONTINUED | OUTPATIENT
Start: 2022-12-15 | End: 2022-12-15 | Stop reason: HOSPADM

## 2022-12-15 RX ORDER — DIPHENHYDRAMINE HYDROCHLORIDE 50 MG/ML
12.5 INJECTION INTRAMUSCULAR; INTRAVENOUS
Status: DISCONTINUED | OUTPATIENT
Start: 2022-12-15 | End: 2022-12-15 | Stop reason: HOSPADM

## 2022-12-15 RX ADMIN — SUCCINYLCHOLINE CHLORIDE 100 MG: 20 INJECTION, SOLUTION INTRAMUSCULAR; INTRAVENOUS at 13:01

## 2022-12-15 RX ADMIN — FAMOTIDINE 20 MG: 10 INJECTION INTRAVENOUS at 12:49

## 2022-12-15 RX ADMIN — FENTANYL CITRATE 50 MCG: 0.05 INJECTION, SOLUTION INTRAMUSCULAR; INTRAVENOUS at 13:40

## 2022-12-15 RX ADMIN — PROPOFOL 50 MG: 10 INJECTION, EMULSION INTRAVENOUS at 13:03

## 2022-12-15 RX ADMIN — SODIUM CHLORIDE, POTASSIUM CHLORIDE, SODIUM LACTATE AND CALCIUM CHLORIDE 1000 ML: 600; 310; 30; 20 INJECTION, SOLUTION INTRAVENOUS at 11:48

## 2022-12-15 RX ADMIN — MIDAZOLAM HYDROCHLORIDE 0.5 MG: 1 INJECTION, SOLUTION INTRAMUSCULAR; INTRAVENOUS at 12:49

## 2022-12-15 RX ADMIN — PROPOFOL 150 MG: 10 INJECTION, EMULSION INTRAVENOUS at 13:00

## 2022-12-15 RX ADMIN — FENTANYL CITRATE 50 MCG: 50 INJECTION INTRAMUSCULAR; INTRAVENOUS at 13:00

## 2022-12-15 NOTE — OP NOTE
Preop diagnosis: Chronic dysphonia    Postop diagnosis: Same    Procedure: Direct laryngoscopy    Surgeon: Orlando    Specimen: None    Locations: None    Blood loss: 0 mL    Description: Patient was placed supine on the operating table where oral intubation was completed.  The true vocal cords epiglottis tongue base and hypopharynx were visualized with the image guided intubation system and were noted to be normal.  Vocal cord motion was also observed and noted to be symmetric and vigorous.    Once intubation was complete I repositioned the patient for direct laryngoscopy.  The Stortz anterior commissure laryngoscope was introduced in the oral cavity.  I again visualized epiglottis and true cords.  Because of the patient's small mouth, stiff jaw, stiff neck, and narrow dental arch, we were unable to achieve adequate suspension laryngoscopy to allow visualization of the subglottis.    Conclusion of the procedure I withdrew the scope the patient was awakened and returned to recovery.    I should note that the endotracheal tube passed smoothly and easily into the subglottis at the time of intubation without resistance.
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
## 2022-12-15 NOTE — ANESTHESIA POSTPROCEDURE EVALUATION
"Patient: Chris Brown Jr.    Procedure Summary     Date: 12/15/22 Room / Location: [facility] OR  / [facility] MAIN OR    Anesthesia Start: 1254 Anesthesia Stop: 1325    Procedure: LARYNGOSCOPY DIRECT; DIAGNOSTIC, WITH OPERATING MICROSCOPE (Esophagus) Diagnosis:       Laryngeal spasm      (Laryngeal spasm [J38.5])    Surgeons: Jordan Perry MD Provider: Elijah Ceja MD    Anesthesia Type: general ASA Status: 3          Anesthesia Type: general    Vitals  Vitals Value Taken Time   /73 12/15/22 1328   Temp 36.7 °C (98.1 °F) 12/15/22 1323   Pulse 62 12/15/22 1328   Resp 16 12/15/22 1328   SpO2 93 % 12/15/22 1328           Post Anesthesia Care and Evaluation    Patient location during evaluation: PACU  Patient participation: complete - patient participated  Level of consciousness: awake and alert  Pain management: adequate    Airway patency: patent  Anesthetic complications: No anesthetic complications    Cardiovascular status: acceptable  Respiratory status: acceptable  Hydration status: acceptable    Comments: /73   Pulse 62   Temp 36.7 °C (98.1 °F) (Infrared)   Resp 16   Ht 177.8 cm (70\")   Wt 105 kg (232 lb)   SpO2 93%   BMI 33.29 kg/m²         "

## 2022-12-15 NOTE — ANESTHESIA PREPROCEDURE EVALUATION
Anesthesia Evaluation     Patient summary reviewed and Nursing notes reviewed   history of anesthetic complications: difficult airway  NPO Solid Status: > 8 hours  NPO Liquid Status: > 2 hours           Airway   Mallampati: III  TM distance: <3 FB  Neck ROM: limited  possible difficult intubation  Dental - normal exam     Pulmonary - negative pulmonary ROS and normal exam    breath sounds clear to auscultation  (-) rhonchi, decreased breath sounds, wheezes, rales, stridor  Cardiovascular - normal exam    ECG reviewed  Patient on routine beta blocker and Beta blocker given within 24 hours of surgery  Rhythm: regular  Rate: normal    (+) hypertension well controlled,   (-) murmur, weak pulses, friction rub, systolic click, carotid bruits, JVD, peripheral edema      Neuro/Psych- negative ROS  GI/Hepatic/Renal/Endo    (+)  GERD,      Musculoskeletal (-) negative ROS    Abdominal  - normal exam    Abdomen: soft.   Substance History - negative use     OB/GYN negative ob/gyn ROS         Other - negative ROS       ROS/Med Hx Other: Hx cervical frx w/ ecreased rom                    Anesthesia Plan    ASA 3     general     (CMAC used last intubation)  intravenous induction     Anesthetic plan, risks, benefits, and alternatives have been provided, discussed and informed consent has been obtained with: patient and spouse/significant other.

## 2022-12-15 NOTE — ANESTHESIA PROCEDURE NOTES
Airway  Urgency: elective    Date/Time: 12/15/2022 1:04 PM    General Information and Staff    Patient location during procedure: OR  Anesthesiologist: Elijah Ceja MD    Indications and Patient Condition  Indications for airway management: airway protection    Preoxygenated: yes  MILS maintained throughout  Mask difficulty assessment: 1 - vent by mask    Final Airway Details  Final airway type: endotracheal airway      Successful airway: ETT  Cuffed: yes   Successful intubation technique: video laryngoscopy  Facilitating devices/methods: intubating stylet  Endotracheal tube insertion site: oral  Blade: CMAC  Blade size: 4  Cormack-Lehane Classification: grade I - full view of glottis  Placement verified by: capnometry   Measured from: teeth  Number of attempts at approach: 1  Assessment: lips, teeth, and gum same as pre-op and atraumatic intubation    Additional Comments  Visualization by Dr Perry with [facility] prior to intubation

## 2023-03-15 ENCOUNTER — OFFICE (AMBULATORY)
Dept: URBAN - METROPOLITAN AREA CLINIC 76 | Facility: CLINIC | Age: 68
End: 2023-03-15

## 2023-03-15 VITALS
HEART RATE: 76 BPM | DIASTOLIC BLOOD PRESSURE: 62 MMHG | SYSTOLIC BLOOD PRESSURE: 134 MMHG | HEIGHT: 70 IN | WEIGHT: 235 LBS

## 2023-03-15 DIAGNOSIS — I10 ESSENTIAL (PRIMARY) HYPERTENSION: ICD-10-CM

## 2023-03-15 DIAGNOSIS — Z86.010 PERSONAL HISTORY OF COLONIC POLYPS: ICD-10-CM

## 2023-03-15 DIAGNOSIS — K21.9 GASTRO-ESOPHAGEAL REFLUX DISEASE WITHOUT ESOPHAGITIS: ICD-10-CM

## 2023-03-15 PROCEDURE — 99204 OFFICE O/P NEW MOD 45 MIN: CPT | Performed by: INTERNAL MEDICINE

## 2023-04-13 VITALS
RESPIRATION RATE: 16 BRPM | RESPIRATION RATE: 10 BRPM | DIASTOLIC BLOOD PRESSURE: 68 MMHG | WEIGHT: 236 LBS | SYSTOLIC BLOOD PRESSURE: 98 MMHG | SYSTOLIC BLOOD PRESSURE: 118 MMHG | HEART RATE: 74 BPM | SYSTOLIC BLOOD PRESSURE: 129 MMHG | RESPIRATION RATE: 20 BRPM | DIASTOLIC BLOOD PRESSURE: 60 MMHG | DIASTOLIC BLOOD PRESSURE: 72 MMHG | DIASTOLIC BLOOD PRESSURE: 71 MMHG | SYSTOLIC BLOOD PRESSURE: 127 MMHG | HEART RATE: 70 BPM | HEART RATE: 73 BPM | DIASTOLIC BLOOD PRESSURE: 62 MMHG | RESPIRATION RATE: 19 BRPM | RESPIRATION RATE: 15 BRPM | SYSTOLIC BLOOD PRESSURE: 133 MMHG | SYSTOLIC BLOOD PRESSURE: 139 MMHG | HEART RATE: 78 BPM | TEMPERATURE: 97.6 F | RESPIRATION RATE: 27 BRPM | OXYGEN SATURATION: 98 % | HEART RATE: 69 BPM | DIASTOLIC BLOOD PRESSURE: 75 MMHG | DIASTOLIC BLOOD PRESSURE: 69 MMHG | HEIGHT: 70 IN | HEART RATE: 67 BPM | TEMPERATURE: 98.6 F | SYSTOLIC BLOOD PRESSURE: 120 MMHG | DIASTOLIC BLOOD PRESSURE: 61 MMHG | HEART RATE: 68 BPM | DIASTOLIC BLOOD PRESSURE: 76 MMHG | DIASTOLIC BLOOD PRESSURE: 65 MMHG | DIASTOLIC BLOOD PRESSURE: 66 MMHG | RESPIRATION RATE: 17 BRPM | OXYGEN SATURATION: 96 % | SYSTOLIC BLOOD PRESSURE: 141 MMHG | SYSTOLIC BLOOD PRESSURE: 146 MMHG | DIASTOLIC BLOOD PRESSURE: 83 MMHG | SYSTOLIC BLOOD PRESSURE: 119 MMHG | OXYGEN SATURATION: 95 % | HEART RATE: 72 BPM | SYSTOLIC BLOOD PRESSURE: 130 MMHG | OXYGEN SATURATION: 94 % | RESPIRATION RATE: 14 BRPM | OXYGEN SATURATION: 97 % | SYSTOLIC BLOOD PRESSURE: 126 MMHG | RESPIRATION RATE: 18 BRPM | DIASTOLIC BLOOD PRESSURE: 59 MMHG | SYSTOLIC BLOOD PRESSURE: 138 MMHG | OXYGEN SATURATION: 93 % | HEART RATE: 71 BPM | SYSTOLIC BLOOD PRESSURE: 112 MMHG | RESPIRATION RATE: 22 BRPM | DIASTOLIC BLOOD PRESSURE: 80 MMHG

## 2023-04-20 ENCOUNTER — OFFICE (AMBULATORY)
Dept: URBAN - METROPOLITAN AREA PATHOLOGY 4 | Facility: PATHOLOGY | Age: 68
End: 2023-04-20

## 2023-04-20 ENCOUNTER — AMBULATORY SURGICAL CENTER (AMBULATORY)
Dept: URBAN - METROPOLITAN AREA SURGERY 17 | Facility: SURGERY | Age: 68
End: 2023-04-20

## 2023-04-20 DIAGNOSIS — D12.4 BENIGN NEOPLASM OF DESCENDING COLON: ICD-10-CM

## 2023-04-20 DIAGNOSIS — D12.3 BENIGN NEOPLASM OF TRANSVERSE COLON: ICD-10-CM

## 2023-04-20 DIAGNOSIS — K22.89 OTHER SPECIFIED DISEASE OF ESOPHAGUS: ICD-10-CM

## 2023-04-20 DIAGNOSIS — K31.89 OTHER DISEASES OF STOMACH AND DUODENUM: ICD-10-CM

## 2023-04-20 DIAGNOSIS — R13.10 DYSPHAGIA, UNSPECIFIED: ICD-10-CM

## 2023-04-20 DIAGNOSIS — K62.1 RECTAL POLYP: ICD-10-CM

## 2023-04-20 DIAGNOSIS — K21.9 GASTRO-ESOPHAGEAL REFLUX DISEASE WITHOUT ESOPHAGITIS: ICD-10-CM

## 2023-04-20 DIAGNOSIS — Z86.010 PERSONAL HISTORY OF COLONIC POLYPS: ICD-10-CM

## 2023-04-20 PROBLEM — K63.5 POLYP OF COLON: Status: ACTIVE | Noted: 2023-04-20

## 2023-04-20 LAB
GI HISTOLOGY: A. SELECT: (no result)
GI HISTOLOGY: B. UNSPECIFIED: (no result)
GI HISTOLOGY: C. UNSPECIFIED: (no result)
GI HISTOLOGY: D. UNSPECIFIED: (no result)
GI HISTOLOGY: E. UNSPECIFIED: (no result)
GI HISTOLOGY: F. UNSPECIFIED: (no result)
GI HISTOLOGY: G. UNSPECIFIED: (no result)
GI HISTOLOGY: PDF REPORT: (no result)

## 2023-04-20 PROCEDURE — 43450 DILATE ESOPHAGUS 1/MULT PASS: CPT | Performed by: INTERNAL MEDICINE

## 2023-04-20 PROCEDURE — 88305 TISSUE EXAM BY PATHOLOGIST: CPT | Performed by: INTERNAL MEDICINE

## 2023-04-20 PROCEDURE — 88342 IMHCHEM/IMCYTCHM 1ST ANTB: CPT | Performed by: INTERNAL MEDICINE

## 2023-04-20 PROCEDURE — 45385 COLONOSCOPY W/LESION REMOVAL: CPT | Mod: PT | Performed by: INTERNAL MEDICINE

## 2023-04-20 PROCEDURE — 43239 EGD BIOPSY SINGLE/MULTIPLE: CPT | Performed by: INTERNAL MEDICINE

## 2023-09-15 ENCOUNTER — OFFICE (AMBULATORY)
Dept: URBAN - METROPOLITAN AREA CLINIC 76 | Facility: CLINIC | Age: 68
End: 2023-09-15

## 2023-09-15 VITALS
DIASTOLIC BLOOD PRESSURE: 67 MMHG | HEART RATE: 64 BPM | OXYGEN SATURATION: 95 % | WEIGHT: 233 LBS | SYSTOLIC BLOOD PRESSURE: 120 MMHG | HEIGHT: 70 IN

## 2023-09-15 DIAGNOSIS — R13.10 DYSPHAGIA, UNSPECIFIED: ICD-10-CM

## 2023-09-15 DIAGNOSIS — Z86.010 PERSONAL HISTORY OF COLONIC POLYPS: ICD-10-CM

## 2023-09-15 DIAGNOSIS — K21.9 GASTRO-ESOPHAGEAL REFLUX DISEASE WITHOUT ESOPHAGITIS: ICD-10-CM

## 2023-09-15 PROCEDURE — 99214 OFFICE O/P EST MOD 30 MIN: CPT

## 2023-09-15 RX ORDER — OMEPRAZOLE 40 MG/1
40 CAPSULE, DELAYED RELEASE ORAL
Qty: 90 | Refills: 3 | Status: ACTIVE

## 2024-11-18 ENCOUNTER — APPOINTMENT (OUTPATIENT)
Dept: GENERAL RADIOLOGY | Facility: HOSPITAL | Age: 69
End: 2024-11-18
Payer: MEDICARE

## 2024-11-18 ENCOUNTER — HOSPITAL ENCOUNTER (OUTPATIENT)
Facility: HOSPITAL | Age: 69
Discharge: HOME | End: 2024-11-18
Attending: STUDENT IN AN ORGANIZED HEALTH CARE EDUCATION/TRAINING PROGRAM | Admitting: STUDENT IN AN ORGANIZED HEALTH CARE EDUCATION/TRAINING PROGRAM
Payer: MEDICARE

## 2024-11-18 VITALS
RESPIRATION RATE: 16 BRPM | DIASTOLIC BLOOD PRESSURE: 68 MMHG | SYSTOLIC BLOOD PRESSURE: 135 MMHG | TEMPERATURE: 98 F | BODY MASS INDEX: 32.21 KG/M2 | HEIGHT: 70 IN | HEART RATE: 85 BPM | WEIGHT: 225 LBS | OXYGEN SATURATION: 93 %

## 2024-11-18 DIAGNOSIS — U07.1 COVID-19: Primary | ICD-10-CM

## 2024-11-18 LAB
FLUAV SUBTYP SPEC NAA+PROBE: NOT DETECTED
FLUBV RNA ISLT QL NAA+PROBE: NOT DETECTED
SARS-COV-2 RNA RESP QL NAA+PROBE: DETECTED
STREP A PCR: NOT DETECTED

## 2024-11-18 PROCEDURE — 87636 SARSCOV2 & INF A&B AMP PRB: CPT

## 2024-11-18 PROCEDURE — 71046 X-RAY EXAM CHEST 2 VIEWS: CPT

## 2024-11-18 PROCEDURE — 87651 STREP A DNA AMP PROBE: CPT

## 2024-11-18 PROCEDURE — G0463 HOSPITAL OUTPT CLINIC VISIT: HCPCS | Performed by: PHYSICIAN ASSISTANT

## 2024-11-18 RX ORDER — DEXTROMETHORPHAN HYDROBROMIDE AND PROMETHAZINE HYDROCHLORIDE 15; 6.25 MG/5ML; MG/5ML
5 SYRUP ORAL 4 TIMES DAILY PRN
Qty: 180 ML | Refills: 0 | Status: SHIPPED | OUTPATIENT
Start: 2024-11-18

## 2024-11-18 NOTE — DISCHARGE INSTRUCTIONS
Tylenol and ibuprofen for fever and chills.  Take the Paxlovid and cough medicine/decongestant to help with symptoms.  Recommend lots of rest until resolve the symptoms.  You will want to self quarantine for 5 days from onset of symptoms and be fever free for at least 24 hours.  I would also recommend wearing a mask around others for an additional 5 days to prevent spread.  Follow-up closely with primary care as needed.  Return to the ER with worsening symptoms.

## 2024-11-19 NOTE — FSED PROVIDER NOTE
EMERGENCY DEPARTMENT ENCOUNTER    Room Number:  08/08  Date seen:  11/18/2024  Time seen: 22:38 EST  PCP: Jody Ceja APRN  Historian: Patient    Discussed/obtained information from independent historians: N/A    HPI:  Chief complaint: Cough congestion  A complete HPI/ROS/PMH/PSH/SH/FH are unobtainable due to: Nothing  Context:Chris Brown Jr. is a 69 y.o. male who presents to the ED with c/o of congestion for the past 3 days.  No fever but the patient has had chills.  No nausea vomiting diarrhea or abdominal pain.  No chest pain or palpitations or shortness of breath.  Patient denies past medical history of asthma.  He is here for further evaluation.        Chronic or social conditions impacting care:    ALLERGIES  Hydrocodone    PAST MEDICAL HISTORY  Active Ambulatory Problems     Diagnosis Date Noted    Primary osteoarthritis of knee 04/25/2017    HTN (hypertension) 08/07/2019     Resolved Ambulatory Problems     Diagnosis Date Noted    No Resolved Ambulatory Problems     Past Medical History:   Diagnosis Date    Acid reflux     Cervicalgia     Decreased ROM of neck     MORALES (dyspnea on exertion)     H/O cervical fracture 08/24/2016    Hypertension     Left shoulder pain     Migraines     Prediabetes     Traumatic complete tear of left rotator cuff 08/24/2016       PAST SURGICAL HISTORY  Past Surgical History:   Procedure Laterality Date    DIRECT LARYNGOSCOPY, ESOPHAGOSCOPY, BRONCHOSCOPY N/A 12/15/2022    Procedure: LARYNGOSCOPY DIRECT; DIAGNOSTIC, WITH OPERATING MICROSCOPE;  Surgeon: Jordan Perry MD;  Location: [facility] MAIN OR;  Service: ENT;  Laterality: N/A;    HERNIA REPAIR      MA ARTHRP KNE CONDYLE&PLATU MEDIAL&LAT COMPARTMENTS Right 4/25/2017    Procedure: RT TOTAL KNEE ARTHROPLASTY;  Surgeon: Tomas Scott MD;  Location: [facility] MAIN OR;  Service: Orthopedics    SHOULDER ARTHROSCOPY Right     TOTAL KNEE ARTHROPLASTY Left 2016    TOTAL SHOULDER ARTHROPLASTY W/ DISTAL  CLAVICLE EXCISION Left 12/16/2021    Procedure: REVERSE TOTAL SHOULDER ARTHROPLASTY;  Surgeon: Shankar Nunez MD;  [place] [facility] [place] [facility]  Service: Orthopedics;  Laterality: Left;       FAMILY HISTORY  Family History   Problem Relation Age of Onset    Malig Hyperthermia Neg Hx        SOCIAL HISTORY  Social History     Socioeconomic History    Marital status:    Tobacco Use    Smoking status: Never    Smokeless tobacco: Never   Vaping Use    Vaping status: Never Used   Substance and Sexual Activity    Alcohol use: No    Drug use: No       REVIEW OF SYSTEMS  Review of Systems    All systems reviewed and negative except for those discussed in HPI.     PHYSICAL EXAM    I have reviewed the triage vital signs and nursing notes.  Vitals:    11/18/24 1301   BP: 135/68   Pulse: 85   Resp: 16   Temp:    SpO2: 93%     Physical Exam    GENERAL: DW male, not distressed  HENT: nares patent  EYES: no scleral icterus  NECK: no ROM limitations  CV: regular rhythm, regular rate  RESPIRATORY: normal effort, lungs CTAB  ABDOMEN: soft, nontender  : deferred  MUSCULOSKELETAL: no deformity  NEURO: alert, moves all extremities, follows commands  SKIN: warm, dry    LAB RESULTS  Recent Results (from the past 24 hours)   Rapid Strep A Screen - Swab, Throat    Collection Time: 11/18/24  1:03 PM    Specimen: Throat; Swab   Result Value Ref Range    STREP A PCR Not Detected Not Detected   COVID-19 and FLU A/B PCR, 1 HR TAT - Swab, Nasopharynx    Collection Time: 11/18/24  1:03 PM    Specimen: Nasopharynx; Swab   Result Value Ref Range    COVID19 Detected (C) Not Detected - Ref. Range    Influenza A PCR Not Detected Not Detected    Influenza B PCR Not Detected Not Detected       Ordered the above labs and independently interpreted results.  My findings will be discussed in the ED course or medical decision making section below    RADIOLOGY RESULTS  XR Chest PA & Lateral    Result Date: 11/18/2024  XR CHEST PA AND LATERAL-11/18/2024   HISTORY: Upper respiratory infection.  Heart size is within normal limits. There are minor degenerative changes in the spine. Left shoulder prosthesis is partially visualized. Bones and soft tissues are otherwise unremarkable.      1. No acute process.   This report was finalized on 11/18/2024 1:53 PM by Dr. Alberto Mckeon M.D on Workstation: [employer]        Ordered the above noted radiological studies.  Independently interpreted by me.  My findings will be discussed in the medical decision section below.     PROGRESS, DATA ANALYSIS, CONSULTS AND MEDICAL DECISION MAKING    Please note that this section constitutes my independent interpretation of clinical data including lab results, radiology, EKG's.  This constitutes my independent professional opinion regarding differential diagnosis and management of this patient.  It may include any factors such as history from outside sources, review of external records, social determinants of health, management of medications, response to those treatments, and discussions with other providers.    ED Course as of 11/18/24 2239   Mon Nov 18, 2024   1404 Chest x-ray shows no acute process.  Patient is positive for COVID-19.  He is roughly in his third day of illness. [RC]   1405 Patient is not on a statin or any meds contraindicated to Paxlovid.  He does have comorbidities.  Will go ahead and treat with Paxlovid, and symptomatically. [RC]      ED Course User Index  [RC] Tarun Toney III, PA     Orders placed during this visit:  Orders Placed This Encounter   Procedures    Rapid Strep A Screen - Swab, Throat    COVID-19 and FLU A/B PCR, 1 HR TAT - Swab, Nasopharynx    XR Chest PA & Lateral            Medical Decision Making    COVID, flu, strep, PNA.  Patient was positive for COVID-19 at triage.  Chest x-ray has also been obtained and there are no infiltrates consistent with pneumonia.  Will go ahead and treat with Paxlovid as well as treat  symptoms.      DIAGNOSIS  Final diagnoses:   COVID-19          Medication List        New Prescriptions      Nirmatrelvir & Ritonavir (300mg/100mg)  Commonly known as: PAXLOVID  Take 3 tablets by mouth 2 (Two) Times a Day for 5 days.     promethazine-dextromethorphan 6.25-15 MG/5ML syrup  Commonly known as: PROMETHAZINE-DM  Take 5 mL by mouth 4 (Four) Times a Day As Needed for Cough.               Where to Get Your Medications        These medications were sent to [id number] - [place], [place] - [address] [address] [facility] AT [facility] & [address] - [phone]  - [phone]   [id number] [address] [facility] [facility], [facility] [address]      Phone: [phone]   Nirmatrelvir & Ritonavir (300mg/100mg)  promethazine-dextromethorphan 6.25-15 MG/5ML syrup         FOLLOW-UP  Jody Ceja, APRN  [address] [id number] [phone]    Schedule an appointment as soon as possible for a visit   For further evaluation and treatment        Latest Documented Vital Signs:  As of 22:39 EST  BP- 135/68 HR- 85 Temp- 98 °F (36.7 °C) (Temporal) O2 sat- 93%    Appropriate PPE utilized throughout this patient encounter to include mask, if indicated, per current protocol. Hand hygiene was performed before donning PPE and after removal when leaving the room.    Please note that portions of this were completed with a voice recognition program.     Note Disclaimer: At [facility], we believe that sharing information builds trust and better relationships. You are receiving this note because you are receiving care at [facility] or recently visited. It is possible you will see health information before a provider has talked with you about it. This kind of information can be easy to misunderstand. To help you fully understand what it means for your health, we urge you to discuss this note with your provider.

## (undated) DEVICE — SPNG GZ WOVN 4X4IN 12PLY 10/BX STRL

## (undated) DEVICE — GOWN,AURORA,NONREINFORCED,XLARGE: Brand: MEDLINE

## (undated) DEVICE — SKIN PREP TRAY W/CHG: Brand: MEDLINE INDUSTRIES, INC.

## (undated) DEVICE — DRAPE,REIN 53X77,STERILE: Brand: MEDLINE

## (undated) DEVICE — GLV SURG BIOGEL LTX PF 7 1/2

## (undated) DEVICE — DRSNG TELFA PAD NONADH STR 1S 3X4IN

## (undated) DEVICE — KT ANTI FOG W/FLD AND SPNG

## (undated) DEVICE — UNDERCAST PADDING: Brand: DEROYAL

## (undated) DEVICE — DUAL CUT SAGITTAL BLADE

## (undated) DEVICE — GLV SURG BIOGEL LTX PF 6 1/2

## (undated) DEVICE — PK ENT 46

## (undated) DEVICE — PIN TROC SIGNATURE PK/2

## (undated) DEVICE — HANDPIECE SET WITH COAXIAL HIGH FLOW TIP AND SUCTION TUBE: Brand: INTERPULSE

## (undated) DEVICE — PK SHLDR OPN 40

## (undated) DEVICE — NDL SPINE 20G 3 1/2 YEL STRL 1P/U

## (undated) DEVICE — SPNG LAP 18X18IN LF STRL PK/5

## (undated) DEVICE — CODMAN® SURGICAL PATTIES 1" X 3" (2.54CM X 7.62CM): Brand: CODMAN®

## (undated) DEVICE — SUT ETHIB 2 CV V37 MS/4 30IN MX69G

## (undated) DEVICE — DECANT BG O JET

## (undated) DEVICE — PATIENT RETURN ELECTRODE, SINGLE-USE, CONTACT QUALITY MONITORING, ADULT, WITH 9FT CORD, FOR PATIENTS WEIGING OVER 33LBS. (15KG): Brand: MEGADYNE

## (undated) DEVICE — KT PIN HEX SHLDR CORACOID EXACTECHGPS DISP

## (undated) DEVICE — ENCORE® LATEX ORTHO SIZE 7.5, STERILE LATEX POWDER-FREE SURGICAL GLOVE: Brand: ENCORE

## (undated) DEVICE — NDL HYPO PRECISIONGLIDE REG 19G 1 1/2

## (undated) DEVICE — GLV SURG BIOGEL LTX PF 8 1/2

## (undated) DEVICE — POOLE SUCTION HANDLE: Brand: CARDINAL HEALTH

## (undated) DEVICE — KT DRN EVAC WND PVC PCH WTROC RND 10F400

## (undated) DEVICE — PAD,ABDOMINAL,8"X10",ST,LF: Brand: MEDLINE

## (undated) DEVICE — ZIP 16 SURGICAL SKIN CLOSURE DEVICE, PSA: Brand: ZIP 16 SURGICAL SKIN CLOSURE DEVICE

## (undated) DEVICE — STPLR SKIN VISISTAT WD 35CT

## (undated) DEVICE — SHEET, DRAPE, SPLIT, STERILE: Brand: MEDLINE

## (undated) DEVICE — OCCLUSIVE GAUZE STRIP,3% BISMUTH TRIBROMOPHENATE IN PETROLATUM BLEND: Brand: XEROFORM

## (undated) DEVICE — BNDG ELAS CO-FLEX SLF ADHR 4IN5YD LF STRL

## (undated) DEVICE — KT SHLDR EXACTECHGPS DISP

## (undated) DEVICE — BNDG ELAS ELITE V/CLOSE 6IN 5YD LF STRL

## (undated) DEVICE — TRAP FLD MINIVAC MEGADYNE 100ML

## (undated) DEVICE — ANTIBACTERIAL UNDYED BRAIDED (POLYGLACTIN 910), SYNTHETIC ABSORBABLE SUTURE: Brand: COATED VICRYL

## (undated) DEVICE — ARM SLING: Brand: DEROYAL

## (undated) DEVICE — DRSNG GZ PETROLTM XEROFORM CURAD 1X8IN STRL

## (undated) DEVICE — SUT VIC 0 CT1 36IN J946H

## (undated) DEVICE — PREP SOL POVIDONE/IODINE BT 4OZ

## (undated) DEVICE — MAT FLR ABSORBENT LG 4FT 10 2.5FT

## (undated) DEVICE — GLV SURG BIOGEL LTX PF 7

## (undated) DEVICE — BNDG ELAS ELITE V/CLOSE 4IN 5YD LF STRL

## (undated) DEVICE — GLV SURG BIOGEL LTX PF 8

## (undated) DEVICE — SUT VIC 2/0 X1 27IN J459H

## (undated) DEVICE — BLANKT WARM LOWR/BDY 100X120CM

## (undated) DEVICE — PENCL E/S ULTRAVAC TELESCP NOSE HOLSTR 10FT

## (undated) DEVICE — PK KN TOTL 40

## (undated) DEVICE — APPL CHLORAPREP W/TINT 26ML ORNG